# Patient Record
Sex: MALE | Race: ASIAN | NOT HISPANIC OR LATINO | URBAN - METROPOLITAN AREA
[De-identification: names, ages, dates, MRNs, and addresses within clinical notes are randomized per-mention and may not be internally consistent; named-entity substitution may affect disease eponyms.]

---

## 2017-10-07 ENCOUNTER — EMERGENCY (EMERGENCY)
Facility: HOSPITAL | Age: 32
LOS: 1 days | Discharge: HOME | End: 2017-10-07
Attending: EMERGENCY MEDICINE | Admitting: EMERGENCY MEDICINE
Payer: COMMERCIAL

## 2017-10-07 VITALS
SYSTOLIC BLOOD PRESSURE: 119 MMHG | OXYGEN SATURATION: 98 % | RESPIRATION RATE: 17 BRPM | DIASTOLIC BLOOD PRESSURE: 79 MMHG | HEART RATE: 61 BPM

## 2017-10-07 VITALS
DIASTOLIC BLOOD PRESSURE: 82 MMHG | HEIGHT: 73 IN | TEMPERATURE: 98 F | SYSTOLIC BLOOD PRESSURE: 143 MMHG | WEIGHT: 250 LBS | RESPIRATION RATE: 16 BRPM | OXYGEN SATURATION: 99 % | HEART RATE: 75 BPM

## 2017-10-07 DIAGNOSIS — M10.9 GOUT, UNSPECIFIED: ICD-10-CM

## 2017-10-07 DIAGNOSIS — R60.0 LOCALIZED EDEMA: ICD-10-CM

## 2017-10-07 DIAGNOSIS — F17.210 NICOTINE DEPENDENCE, CIGARETTES, UNCOMPLICATED: ICD-10-CM

## 2017-10-07 DIAGNOSIS — M25.561 PAIN IN RIGHT KNEE: ICD-10-CM

## 2017-10-07 DIAGNOSIS — M54.31 SCIATICA, RIGHT SIDE: ICD-10-CM

## 2017-10-07 PROCEDURE — 73562 X-RAY EXAM OF KNEE 3: CPT

## 2017-10-07 PROCEDURE — 93971 EXTREMITY STUDY: CPT

## 2017-10-07 PROCEDURE — 93971 EXTREMITY STUDY: CPT | Mod: 26,RT

## 2017-10-07 PROCEDURE — 73562 X-RAY EXAM OF KNEE 3: CPT | Mod: 26,RT

## 2017-10-07 PROCEDURE — 99284 EMERGENCY DEPT VISIT MOD MDM: CPT | Mod: 25

## 2017-10-07 PROCEDURE — 99284 EMERGENCY DEPT VISIT MOD MDM: CPT

## 2017-10-07 PROCEDURE — 96372 THER/PROPH/DIAG INJ SC/IM: CPT

## 2017-10-07 RX ORDER — CYCLOBENZAPRINE HYDROCHLORIDE 10 MG/1
1 TABLET, FILM COATED ORAL
Qty: 9 | Refills: 0 | OUTPATIENT
Start: 2017-10-07 | End: 2017-10-10

## 2017-10-07 RX ORDER — KETOROLAC TROMETHAMINE 30 MG/ML
60 SYRINGE (ML) INJECTION ONCE
Qty: 0 | Refills: 0 | Status: DISCONTINUED | OUTPATIENT
Start: 2017-10-07 | End: 2017-10-07

## 2017-10-07 RX ORDER — IBUPROFEN 200 MG
1 TABLET ORAL
Qty: 20 | Refills: 0 | OUTPATIENT
Start: 2017-10-07 | End: 2017-10-12

## 2017-10-07 RX ADMIN — Medication 60 MILLIGRAM(S): at 13:40

## 2017-10-07 RX ADMIN — Medication 60 MILLIGRAM(S): at 13:55

## 2017-10-07 NOTE — ED PROVIDER NOTE - PROGRESS NOTE DETAILS
patient resting comfortably, doppler negative dvt, xray knee small effusion, advised follow up with ortho, given information for PMD Dr Fulton.  rx for motrin and flexeril sent to pharmacy, copy of results given

## 2017-10-07 NOTE — ED ADULT TRIAGE NOTE - CHIEF COMPLAINT QUOTE
Pt reports Right LE swelling for 2 weeks and originally patient believed to be gout and took his medicine, but now no relief

## 2017-10-07 NOTE — ED PROVIDER NOTE - ATTENDING CONTRIBUTION TO CARE
pt c/o 2 weeks of right knee pain. pt denies trauma, fevers, chills, weakness, numbness, back pain. 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  rle hip nt, full rom, knee tender with mild decreased rom, ankle nt, full rom, distal n/v intact

## 2017-10-07 NOTE — ED ADULT NURSE NOTE - OBJECTIVE STATEMENT
Presents to ER w c/o gout/right leg pain.  Pt states he ran out of his gout meds and hasn't taken them in the past 2 months.  Pt reports pain starting at his right groin and extends down into his right knee and leg. Presents to ER w c/o gout/right leg pain.  Pt states he ran out of his gout meds and hasn't taken them in the past 2 months.  Pt reports pain starting at his right groin and extends down into his right knee and leg.  Knee is visibly swollen upon exam.

## 2017-10-07 NOTE — ED PROVIDER NOTE - CARE PLAN
Principal Discharge DX:	Sciatica of right side Principal Discharge DX:	Sciatica of right side  Secondary Diagnosis:	Knee pain, unspecified chronicity, unspecified laterality

## 2017-10-07 NOTE — ED PROVIDER NOTE - OBJECTIVE STATEMENT
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  states has hx of gout , no flare in 2 mos, was on colchicine, completed those tabs and presently takes allopurinol occasionally, is supposed to take it twice a day.  No PMD

## 2018-01-01 ENCOUNTER — OUTPATIENT (OUTPATIENT)
Dept: OUTPATIENT SERVICES | Facility: HOSPITAL | Age: 33
LOS: 1 days | End: 2018-01-01

## 2018-01-25 ENCOUNTER — EMERGENCY (EMERGENCY)
Facility: HOSPITAL | Age: 33
LOS: 1 days | Discharge: HOME | End: 2018-01-25
Attending: EMERGENCY MEDICINE | Admitting: EMERGENCY MEDICINE
Payer: MEDICAID

## 2018-01-25 VITALS
RESPIRATION RATE: 16 BRPM | TEMPERATURE: 98 F | HEART RATE: 70 BPM | OXYGEN SATURATION: 98 % | DIASTOLIC BLOOD PRESSURE: 80 MMHG | SYSTOLIC BLOOD PRESSURE: 129 MMHG

## 2018-01-25 VITALS
HEIGHT: 72 IN | SYSTOLIC BLOOD PRESSURE: 139 MMHG | HEART RATE: 75 BPM | DIASTOLIC BLOOD PRESSURE: 89 MMHG | OXYGEN SATURATION: 97 % | RESPIRATION RATE: 17 BRPM | WEIGHT: 250 LBS | TEMPERATURE: 98 F

## 2018-01-25 DIAGNOSIS — R69 ILLNESS, UNSPECIFIED: ICD-10-CM

## 2018-01-25 PROCEDURE — 99284 EMERGENCY DEPT VISIT MOD MDM: CPT | Mod: 25

## 2018-01-25 PROCEDURE — 73630 X-RAY EXAM OF FOOT: CPT

## 2018-01-25 PROCEDURE — 73140 X-RAY EXAM OF FINGER(S): CPT

## 2018-01-25 PROCEDURE — 73610 X-RAY EXAM OF ANKLE: CPT

## 2018-01-25 PROCEDURE — 73610 X-RAY EXAM OF ANKLE: CPT | Mod: 26,RT

## 2018-01-25 PROCEDURE — 73630 X-RAY EXAM OF FOOT: CPT | Mod: 26,RT

## 2018-01-25 PROCEDURE — 90471 IMMUNIZATION ADMIN: CPT

## 2018-01-25 PROCEDURE — 99284 EMERGENCY DEPT VISIT MOD MDM: CPT

## 2018-01-25 PROCEDURE — 73140 X-RAY EXAM OF FINGER(S): CPT | Mod: 26,RT

## 2018-01-25 PROCEDURE — 90715 TDAP VACCINE 7 YRS/> IM: CPT

## 2018-01-25 RX ORDER — TETANUS TOXOID, REDUCED DIPHTHERIA TOXOID AND ACELLULAR PERTUSSIS VACCINE, ADSORBED 5; 2.5; 8; 8; 2.5 [IU]/.5ML; [IU]/.5ML; UG/.5ML; UG/.5ML; UG/.5ML
0.5 SUSPENSION INTRAMUSCULAR ONCE
Qty: 0 | Refills: 0 | Status: COMPLETED | OUTPATIENT
Start: 2018-01-25 | End: 2018-01-25

## 2018-01-25 RX ORDER — LIDOCAINE 4 G/100G
1 CREAM TOPICAL ONCE
Qty: 0 | Refills: 0 | Status: DISCONTINUED | OUTPATIENT
Start: 2018-01-25 | End: 2018-01-25

## 2018-01-25 RX ORDER — COLCHICINE 0.6 MG
1 TABLET ORAL
Qty: 0 | Refills: 0 | COMMUNITY

## 2018-01-25 RX ORDER — INDOMETHACIN 50 MG
0 CAPSULE ORAL
Qty: 0 | Refills: 0 | COMMUNITY

## 2018-01-25 RX ADMIN — Medication 500 MILLIGRAM(S): at 10:49

## 2018-01-25 RX ADMIN — TETANUS TOXOID, REDUCED DIPHTHERIA TOXOID AND ACELLULAR PERTUSSIS VACCINE, ADSORBED 0.5 MILLILITER(S): 5; 2.5; 8; 8; 2.5 SUSPENSION INTRAMUSCULAR at 10:49

## 2018-01-25 NOTE — ED PROVIDER NOTE - PLAN OF CARE
Return to the ED for any new or worsening symptoms  Take your medication as prescribed  ACE wrap to right ankle as needed for comfort and support   Ice to affected ankle on 20 min off 40 min as needed for pain and swelling  Elevate ankle to reduce swelling  Bacitracin to affected abrasions 2 times a day   Follow up with orthopedics or podiatry in 2-3 days for a recheck   Advance activity as tolerated

## 2018-01-25 NOTE — ED PROVIDER NOTE - PROGRESS NOTE DETAILS
Results of images noted, no acute fracture seen.  Pt made aware that sometimes fractures may be missed on initial films and so the importance of prompt follow up was reviewed.  Pt placed in ACE for comfort.  Bacitracin applied to affected abrasions.  Copy of written instructions provided.  All questions reviewed,  follow up to orthopedics or podiatry provided

## 2018-01-25 NOTE — ED PROVIDER NOTE - OBJECTIVE STATEMENT
Pt is a 33 yo male who presents to the ED with a cc of right ankle pain.  PMHx of gout.  Pt reports that he was walking to work this morning when he stepped into a hole in the cement and "twisted" his right ankle.  Pt reports that he laterally everted his right ankle.  Denies prior fracture to the region.  He was able to stand and ambulate after the incident with mild pain to right ankle lateral aspect.  Pt further reports abrasions to his right knee and right 5th digit.  He did not strike his head, denies LOC.  Pt is not on blood thinners.  Pt denies all other complaints.  He is right hand dominant.  Unsure of date of last Tetanus.

## 2018-01-25 NOTE — ED ADULT NURSE NOTE - OBJECTIVE STATEMENT
Pt reports he was walking from a sidewalk to a street & there was a dip in the road. Pt reports he twisted his right ankle and fell. Abrasions to right knee & tip of right 5th digit, no active bleeding at this time, denies right knee pain, full ROM to right knee & right 5th digit. Swelling to right ankle. Pt reports he landed on his back when he fell, denies back pain, denies hitting head or change in LOC. Pt reports 8/10 right ankle pain, offered Tylenol/Motrin, refused

## 2018-01-25 NOTE — ED ADULT TRIAGE NOTE - CHIEF COMPLAINT QUOTE
"I was heading to work and there was a hole in the street and I twisted my ankle and fell on my back"

## 2018-01-25 NOTE — ED PROVIDER NOTE - MUSCULOSKELETAL, MLM
No midline C/T/L TTP no step offs or deformities noted Right ankle: TTP to right lateral malleolus with moderate swelling no TTP to metatarsal bones sensation intact +pedal pulse Abrasions noted to right knee no TTP to patellar region Abrasion noted to right hand 5th digit palmar aspect distal aspect FROM no yesenia tenderness no snuff box tenderness +radial pulse cap refill less then 2 seconds

## 2018-01-25 NOTE — ED PROVIDER NOTE - CARE PLAN
Principal Discharge DX:	Sprain of right ankle, initial encounter  Assessment and plan of treatment:	Return to the ED for any new or worsening symptoms  Take your medication as prescribed  ACE wrap to right ankle as needed for comfort and support   Ice to affected ankle on 20 min off 40 min as needed for pain and swelling  Elevate ankle to reduce swelling  Bacitracin to affected abrasions 2 times a day   Follow up with orthopedics or podiatry in 2-3 days for a recheck   Advance activity as tolerated  Secondary Diagnosis:	Abrasions of multiple sites  Secondary Diagnosis:	Fall, initial encounter

## 2018-05-01 ENCOUNTER — OUTPATIENT (OUTPATIENT)
Dept: OUTPATIENT SERVICES | Facility: HOSPITAL | Age: 33
LOS: 1 days | End: 2018-05-01
Payer: MEDICAID

## 2018-05-01 PROCEDURE — G9001: CPT

## 2018-05-03 DIAGNOSIS — R69 ILLNESS, UNSPECIFIED: ICD-10-CM

## 2018-10-23 ENCOUNTER — EMERGENCY (EMERGENCY)
Facility: HOSPITAL | Age: 33
LOS: 1 days | Discharge: HOME | End: 2018-10-23
Attending: EMERGENCY MEDICINE
Payer: MEDICAID

## 2018-10-23 VITALS
DIASTOLIC BLOOD PRESSURE: 86 MMHG | SYSTOLIC BLOOD PRESSURE: 129 MMHG | HEART RATE: 84 BPM | TEMPERATURE: 99 F | OXYGEN SATURATION: 98 % | RESPIRATION RATE: 16 BRPM

## 2018-10-23 VITALS
TEMPERATURE: 100 F | SYSTOLIC BLOOD PRESSURE: 128 MMHG | HEART RATE: 100 BPM | OXYGEN SATURATION: 97 % | WEIGHT: 265 LBS | DIASTOLIC BLOOD PRESSURE: 80 MMHG | HEIGHT: 72 IN | RESPIRATION RATE: 16 BRPM

## 2018-10-23 PROCEDURE — 73562 X-RAY EXAM OF KNEE 3: CPT | Mod: 26,RT

## 2018-10-23 PROCEDURE — 73562 X-RAY EXAM OF KNEE 3: CPT

## 2018-10-23 PROCEDURE — 99283 EMERGENCY DEPT VISIT LOW MDM: CPT

## 2018-10-23 PROCEDURE — 99283 EMERGENCY DEPT VISIT LOW MDM: CPT | Mod: 25

## 2018-10-23 RX ORDER — COLCHICINE 0.6 MG
1 TABLET ORAL
Qty: 2 | Refills: 0
Start: 2018-10-23 | End: 2018-10-24

## 2018-10-23 RX ORDER — COLCHICINE 0.6 MG
1.2 TABLET ORAL ONCE
Qty: 0 | Refills: 0 | Status: COMPLETED | OUTPATIENT
Start: 2018-10-23 | End: 2018-10-23

## 2018-10-23 RX ORDER — INDOMETHACIN 50 MG
50 CAPSULE ORAL ONCE
Qty: 0 | Refills: 0 | Status: COMPLETED | OUTPATIENT
Start: 2018-10-23 | End: 2018-10-23

## 2018-10-23 RX ORDER — IBUPROFEN 200 MG
600 TABLET ORAL ONCE
Qty: 0 | Refills: 0 | Status: DISCONTINUED | OUTPATIENT
Start: 2018-10-23 | End: 2018-10-23

## 2018-10-23 RX ORDER — INDOMETHACIN 50 MG
2 CAPSULE ORAL
Qty: 24 | Refills: 0 | OUTPATIENT
Start: 2018-10-23 | End: 2018-10-26

## 2018-10-23 RX ADMIN — Medication 60 MILLIGRAM(S): at 19:28

## 2018-10-23 RX ADMIN — Medication 1.2 MILLIGRAM(S): at 19:28

## 2018-10-23 RX ADMIN — Medication 50 MILLIGRAM(S): at 19:28

## 2018-10-23 NOTE — ED PROVIDER NOTE - ATTENDING CONTRIBUTION TO CARE
I have personally performed a face to face diagnostic evaluation on this patient.  I have reviewed the PA note and agree with the history, exam, and plan of care, except as noted.  History and Exam by me shows  right knee pain for days similar to prior gout, got worse over the weekend.  right knee- diffusely tender c mild swelling and limited rom due to pain.  xr- right knee effusion.

## 2018-10-23 NOTE — ED PROVIDER NOTE - PHYSICAL EXAMINATION
MS RLE:+ RIGHT KNEE DIFFUSELY TTP WITH  MINIMAL SWELLING. NO ERYTHEMA, NO DEFORMITY. UNABLE TO ROM BUT PT ACTIVELY FLEXING KNEE WITHOUT ISSUE. SENSATION GROSSLY INTACT.

## 2018-10-23 NOTE — ED PROVIDER NOTE - PROGRESS NOTE DETAILS
pt improved and walking around er. will rx colchicine and prednisone. advised on S&S of septic joint and when to return to ed. All questions answered and concerns addressed. pt verbalized understanding and agreement with plan and dx. pt advised to follow up with PMD. pt advised to return to ed for worsenng symptoms including fever, cp, sob. will dc.

## 2018-10-23 NOTE — ED ADULT TRIAGE NOTE - CHIEF COMPLAINT QUOTE
"I have pain & swelling in my right knee. It feels like when I've had gout in the other knee in the past. Last time, they drained the knee"

## 2018-10-23 NOTE — ED ADULT NURSE NOTE - NSIMPLEMENTINTERV_GEN_ALL_ED
Implemented All Universal Safety Interventions:  [place] to call system. Call bell, personal items and telephone within reach. Instruct patient to call for assistance. Room bathroom lighting operational. Non-slip footwear when patient is off stretcher. Physically safe environment: no spills, clutter or unnecessary equipment. Stretcher in lowest position, wheels locked, appropriate side rails in place.

## 2018-10-23 NOTE — ED PROVIDER NOTE - OBJECTIVE STATEMENT
pt is a 34yo male with pmhx of gout c/o knee pain x days. pt reports constant right knee pain with swelling. pt reports he did not do anything for pain. pt reports walking increases pain. pt reports hx of gout, last flare L knee, similar symptoms. pt denies injury, cp, sob.

## 2018-10-25 RX ORDER — COLCHICINE 0.6 MG
1 TABLET ORAL
Qty: 10 | Refills: 0
Start: 2018-10-25 | End: 2018-10-29

## 2019-03-15 ENCOUNTER — EMERGENCY (EMERGENCY)
Facility: HOSPITAL | Age: 34
LOS: 1 days | Discharge: HOME | End: 2019-03-15
Attending: EMERGENCY MEDICINE | Admitting: EMERGENCY MEDICINE
Payer: COMMERCIAL

## 2019-03-15 VITALS
SYSTOLIC BLOOD PRESSURE: 120 MMHG | DIASTOLIC BLOOD PRESSURE: 70 MMHG | RESPIRATION RATE: 16 BRPM | TEMPERATURE: 98 F | HEART RATE: 80 BPM | OXYGEN SATURATION: 99 %

## 2019-03-15 VITALS
HEIGHT: 72 IN | SYSTOLIC BLOOD PRESSURE: 83 MMHG | HEART RATE: 83 BPM | OXYGEN SATURATION: 98 % | TEMPERATURE: 99 F | WEIGHT: 279.99 LBS | RESPIRATION RATE: 28 BRPM | DIASTOLIC BLOOD PRESSURE: 58 MMHG

## 2019-03-15 PROCEDURE — 73562 X-RAY EXAM OF KNEE 3: CPT

## 2019-03-15 PROCEDURE — 73562 X-RAY EXAM OF KNEE 3: CPT | Mod: 26,LT

## 2019-03-15 PROCEDURE — 99284 EMERGENCY DEPT VISIT MOD MDM: CPT

## 2019-03-15 PROCEDURE — 99284 EMERGENCY DEPT VISIT MOD MDM: CPT | Mod: 25

## 2019-03-15 RX ORDER — FAMOTIDINE 10 MG/ML
1 INJECTION INTRAVENOUS
Qty: 30 | Refills: 0
Start: 2019-03-15

## 2019-03-15 RX ORDER — COLCHICINE 0.6 MG
1.2 TABLET ORAL ONCE
Qty: 0 | Refills: 0 | Status: COMPLETED | OUTPATIENT
Start: 2019-03-15 | End: 2019-03-15

## 2019-03-15 RX ORDER — COLCHICINE 0.6 MG
0.6 TABLET ORAL ONCE
Qty: 0 | Refills: 0 | Status: COMPLETED | OUTPATIENT
Start: 2019-03-15 | End: 2019-03-15

## 2019-03-15 RX ADMIN — Medication 60 MILLIGRAM(S): at 20:30

## 2019-03-15 RX ADMIN — Medication 1.2 MILLIGRAM(S): at 20:30

## 2019-03-15 RX ADMIN — Medication 500 MILLIGRAM(S): at 21:00

## 2019-03-15 RX ADMIN — Medication 500 MILLIGRAM(S): at 20:30

## 2019-03-15 RX ADMIN — Medication 0.6 MILLIGRAM(S): at 22:20

## 2019-03-15 NOTE — ED PROVIDER NOTE - NSFOLLOWUPINSTRUCTIONS_ED_ALL_ED_FT
PREDNISONE ONCE DAILY  NAPROXEN 1 PILL TWICE DAILY FOR PAIN  FOLLOW UP WITH DR OLMOS  ICE TO KNEE  KEEP ACE WRAP ON FOR COMFORT AS DIRECTED

## 2019-03-15 NOTE — ED PROVIDER NOTE - OBJECTIVE STATEMENT
pt is a 32 yo male who has hx of gout   and sleep apnea cant recall name of his pmd developed sudden onset of left knee pain similar to previous gout pain. the pain was severe so he came toe r fore rebecca  denies trauma no calf pain no calf swelling pain   he is asking for referral to ortho /rheum specialist

## 2019-03-15 NOTE — ED PROVIDER NOTE - MUSCULOSKELETAL, MLM
Spine appears normal, range of motion is limited at left knee with no palp effusion no erythema no calf pain no thigh pain only generalized pain on rom there is full prom with apprehension

## 2019-03-15 NOTE — ED PROVIDER NOTE - CONSTITUTIONAL, MLM
normal... Well appearing, obese male uncomfortable secondary to knee pain well nourished, awake, alert, oriented to person, place, time/situation

## 2019-03-15 NOTE — ED ADULT NURSE NOTE - OBJECTIVE STATEMENT
Patient alert and oriented X 3. Complaining of left knee pain X 3 days. Patient with history of gout. + swelling and limited range of motion

## 2019-03-15 NOTE — ED PROVIDER NOTE - CARE PROVIDER_API CALL
Chito Estrada)  Orthopaedic Surgery  [address] [place] [address]  Phone: [phone]  Fax: [phone]  Follow Up Time:

## 2019-03-15 NOTE — ED PROVIDER NOTE - DIAGNOSTIC INTERPRETATION
knee: there is moderate effusion pt refused knee arthrocentesis or other   allowed for ace wrap- felt better will refer to ortho

## 2019-05-31 ENCOUNTER — EMERGENCY (EMERGENCY)
Facility: HOSPITAL | Age: 34
LOS: 1 days | Discharge: HOME | End: 2019-05-31
Attending: EMERGENCY MEDICINE | Admitting: EMERGENCY MEDICINE
Payer: COMMERCIAL

## 2019-05-31 VITALS
HEART RATE: 80 BPM | WEIGHT: 259.93 LBS | HEIGHT: 73 IN | TEMPERATURE: 99 F | SYSTOLIC BLOOD PRESSURE: 141 MMHG | OXYGEN SATURATION: 97 % | DIASTOLIC BLOOD PRESSURE: 85 MMHG | RESPIRATION RATE: 18 BRPM

## 2019-05-31 VITALS
DIASTOLIC BLOOD PRESSURE: 96 MMHG | OXYGEN SATURATION: 97 % | HEART RATE: 70 BPM | RESPIRATION RATE: 17 BRPM | TEMPERATURE: 98 F | SYSTOLIC BLOOD PRESSURE: 144 MMHG

## 2019-05-31 PROCEDURE — 99283 EMERGENCY DEPT VISIT LOW MDM: CPT

## 2019-05-31 RX ORDER — IBUPROFEN 200 MG
600 TABLET ORAL ONCE
Refills: 0 | Status: COMPLETED | OUTPATIENT
Start: 2019-05-31 | End: 2019-05-31

## 2019-05-31 RX ORDER — COLCHICINE 0.6 MG
1 TABLET ORAL
Qty: 4 | Refills: 0
Start: 2019-05-31 | End: 2019-06-01

## 2019-05-31 RX ORDER — COLCHICINE 0.6 MG
0.6 TABLET ORAL ONCE
Refills: 0 | Status: COMPLETED | OUTPATIENT
Start: 2019-05-31 | End: 2019-05-31

## 2019-05-31 RX ORDER — COLCHICINE 0.6 MG
1.2 TABLET ORAL ONCE
Refills: 0 | Status: COMPLETED | OUTPATIENT
Start: 2019-05-31 | End: 2019-05-31

## 2019-05-31 RX ADMIN — Medication 0.6 MILLIGRAM(S): at 10:54

## 2019-05-31 RX ADMIN — Medication 600 MILLIGRAM(S): at 09:43

## 2019-05-31 RX ADMIN — Medication 1.2 MILLIGRAM(S): at 09:43

## 2019-05-31 RX ADMIN — Medication 60 MILLIGRAM(S): at 09:43

## 2019-05-31 NOTE — ED PROVIDER NOTE - NSFOLLOWUPINSTRUCTIONS_ED_ALL_ED_FT
1) Follow-up with your Primary Medical Doctor or referred doctor. Call today / next business day for prompt follow-up.  2) Return to Emergency room for any worsening or persistent pain, weakness, fever, or any other concerning symptoms.  3) See attached instruction sheets for additional information, including information regarding signs and symptoms to look out for, reasons to seek immediate care and other important instructions.  4) Follow-up with any specialists as discussed / noted as well.   5) Take colchicine 0.6mg twice a day x 2 days.  Prednisone 50mg once a day for 4 days.

## 2019-05-31 NOTE — ED ADULT NURSE NOTE - NSIMPLEMENTINTERV_GEN_ALL_ED
Implemented All Universal Safety Interventions:  [place] to call system. Call bell, personal items and telephone within reach. Instruct patient to call for assistance. Room bathroom lighting operational. Non-slip footwear when patient is off stretcher. Physically safe environment: no spills, clutter or unnecessary equipment. Stretcher in lowest position, wheels locked, appropriate side rails in place.

## 2019-05-31 NOTE — ED PROVIDER NOTE - OBJECTIVE STATEMENT
34 yo male hx of gout c/o gouty flare after drinking alcohol yesterday for cousin's birthday.  c/o pain to left 1st toe and right knee where he usually gets it.  Has appointment with specialist in 1 week.  No fever/chills.  No medications taken for this.  Last flare was 2 months ago.

## 2019-05-31 NOTE — ED ADULT TRIAGE NOTE - CHIEF COMPLAINT QUOTE
patient states he had multiple drinks last night for his cousins birthday and now he feels his gout flaring up in his left big toe and left knee. Not currently on daily medication

## 2019-05-31 NOTE — ED ADULT NURSE NOTE - OBJECTIVE STATEMENT
pt with complaints of left great toe pain, left ankle pain and right knee pain. " I did it to myself. I went out drinking for my cousins birthday and I am having a gout flare up" pt asking for allopurinol prescription.

## 2019-07-18 ENCOUNTER — EMERGENCY (EMERGENCY)
Facility: HOSPITAL | Age: 34
LOS: 1 days | Discharge: HOME | End: 2019-07-18
Attending: EMERGENCY MEDICINE | Admitting: EMERGENCY MEDICINE
Payer: COMMERCIAL

## 2019-07-18 VITALS
RESPIRATION RATE: 18 BRPM | DIASTOLIC BLOOD PRESSURE: 85 MMHG | OXYGEN SATURATION: 98 % | SYSTOLIC BLOOD PRESSURE: 125 MMHG | WEIGHT: 279.99 LBS | TEMPERATURE: 98 F | HEART RATE: 86 BPM

## 2019-07-18 VITALS
OXYGEN SATURATION: 97 % | HEART RATE: 72 BPM | SYSTOLIC BLOOD PRESSURE: 121 MMHG | RESPIRATION RATE: 16 BRPM | TEMPERATURE: 98 F | DIASTOLIC BLOOD PRESSURE: 73 MMHG

## 2019-07-18 PROCEDURE — 99283 EMERGENCY DEPT VISIT LOW MDM: CPT

## 2019-07-18 RX ORDER — IBUPROFEN 200 MG
600 TABLET ORAL ONCE
Refills: 0 | Status: COMPLETED | OUTPATIENT
Start: 2019-07-18 | End: 2019-07-18

## 2019-07-18 RX ORDER — COLCHICINE 0.6 MG
1 TABLET ORAL
Qty: 10 | Refills: 0
Start: 2019-07-18 | End: 2019-07-22

## 2019-07-18 RX ORDER — ACETAMINOPHEN 500 MG
650 TABLET ORAL ONCE
Refills: 0 | Status: COMPLETED | OUTPATIENT
Start: 2019-07-18 | End: 2019-07-18

## 2019-07-18 RX ORDER — COLCHICINE 0.6 MG
0.6 TABLET ORAL ONCE
Refills: 0 | Status: COMPLETED | OUTPATIENT
Start: 2019-07-18 | End: 2019-07-18

## 2019-07-18 RX ADMIN — Medication 0.6 MILLIGRAM(S): at 09:02

## 2019-07-18 RX ADMIN — Medication 600 MILLIGRAM(S): at 09:01

## 2019-07-18 RX ADMIN — Medication 650 MILLIGRAM(S): at 09:01

## 2019-07-18 RX ADMIN — Medication 50 MILLIGRAM(S): at 09:02

## 2019-07-18 NOTE — ED PROVIDER NOTE - OBJECTIVE STATEMENT
Right ankle pain and swelling x 2 days.  HO right big toe injury over 1 week ago.  pt stopped his allupurinol 2 days ago due to possible gout attack .   HO gouty arthritis on big toes and knees.   no other complaint.

## 2019-07-18 NOTE — ED ADULT NURSE NOTE - OBJECTIVE STATEMENT
Presents to ER with right leg pain. Pt states he injured his right great toe on the 4th of July playing football.  Now is c/o pain extending from the foot all the way up the right leg.  Mild swelling noted to right foot. Bounding pedal pulse.

## 2020-03-05 ENCOUNTER — EMERGENCY (EMERGENCY)
Facility: HOSPITAL | Age: 35
LOS: 1 days | Discharge: HOME | End: 2020-03-05
Attending: EMERGENCY MEDICINE | Admitting: EMERGENCY MEDICINE
Payer: SELF-PAY

## 2020-03-05 VITALS
HEIGHT: 72 IN | HEART RATE: 86 BPM | OXYGEN SATURATION: 98 % | SYSTOLIC BLOOD PRESSURE: 133 MMHG | RESPIRATION RATE: 15 BRPM | DIASTOLIC BLOOD PRESSURE: 71 MMHG | WEIGHT: 259.93 LBS | TEMPERATURE: 98 F

## 2020-03-05 PROCEDURE — 99283 EMERGENCY DEPT VISIT LOW MDM: CPT

## 2020-03-05 RX ORDER — VALACYCLOVIR 500 MG/1
1000 TABLET, FILM COATED ORAL ONCE
Refills: 0 | Status: COMPLETED | OUTPATIENT
Start: 2020-03-05 | End: 2020-03-05

## 2020-03-05 RX ORDER — VALACYCLOVIR 500 MG/1
1 TABLET, FILM COATED ORAL
Qty: 30 | Refills: 0
Start: 2020-03-05 | End: 2020-03-14

## 2020-03-05 RX ADMIN — VALACYCLOVIR 1000 MILLIGRAM(S): 500 TABLET, FILM COATED ORAL at 11:57

## 2020-03-05 NOTE — ED PROVIDER NOTE - OBJECTIVE STATEMENT
33 yo male with electric like pains thru left leg x few days and then yesterday developed painful rash to left leg. No fever or chills. No nausea, vomiting or diarrhea. No bowel and or bladder changes.

## 2020-03-05 NOTE — ED ADULT NURSE NOTE - OBJECTIVE STATEMENT
34 year old male with  vesicular rash to left lower leg x 2 days. Also c/o pain to left buttock radiating down left leg.

## 2020-03-05 NOTE — ED PROVIDER NOTE - NSFOLLOWUPINSTRUCTIONS_ED_ALL_ED_FT
Take VALTREX 1-tablet every 8-hours for the next 10-days  Follow-up with your doctor this week  Return here if needed

## 2020-03-05 NOTE — ED ADULT NURSE NOTE - NS TRANSFER PATIENT BELONGINGS
----- Message from Saúl Zhu sent at 6/24/2019  9:47 AM CDT -----  Doctor appointment and lab have been scheduled.  Please link lab orders to the lab appointment.  Date of doctor appointment:  9/9  Date of lab appointment:  8/31  Physical or EP: Physical  Comments: Pt daughter states pt needs kidney and liver levels       Clothing

## 2020-03-05 NOTE — ED PROVIDER NOTE - PATIENT PORTAL LINK FT
You can access the FollowMyHealth Patient Portal offered by [facility] by registering at the following website: [email or web address]. By joining [employer]’s FollowMyHealth portal, you will also be able to view your health information using other applications (apps) compatible with our system.

## 2020-03-05 NOTE — ED PROVIDER NOTE - CARE PROVIDER_API CALL
Tom Miguel)  Infectious Disease; Internal Medicine  [address] [place] [address]  Phone: [phone]  Follow Up Time:

## 2020-03-16 NOTE — ED ADULT NURSE NOTE - CAS ELECT INFOMATION PROVIDED
Patient has more questions regarding the Culture and she is very concerned as to this going to her Brain.  Patient would like to hear from you because she is now stressing out.   DC instructions

## 2020-08-28 ENCOUNTER — EMERGENCY (EMERGENCY)
Facility: HOSPITAL | Age: 35
LOS: 1 days | Discharge: HOME | End: 2020-08-28
Attending: INTERNAL MEDICINE | Admitting: INTERNAL MEDICINE
Payer: MEDICAID

## 2020-08-28 VITALS
TEMPERATURE: 98 F | SYSTOLIC BLOOD PRESSURE: 131 MMHG | OXYGEN SATURATION: 97 % | HEART RATE: 77 BPM | RESPIRATION RATE: 18 BRPM | DIASTOLIC BLOOD PRESSURE: 78 MMHG | WEIGHT: 259.93 LBS | HEIGHT: 72 IN

## 2020-08-28 PROBLEM — F12.90 CANNABIS USE, UNSPECIFIED, UNCOMPLICATED: Chronic | Status: ACTIVE | Noted: 2020-03-05

## 2020-08-28 PROCEDURE — 99283 EMERGENCY DEPT VISIT LOW MDM: CPT | Mod: 25

## 2020-08-28 PROCEDURE — 99283 EMERGENCY DEPT VISIT LOW MDM: CPT

## 2020-08-28 PROCEDURE — 96372 THER/PROPH/DIAG INJ SC/IM: CPT

## 2020-08-28 RX ORDER — COLCHICINE 0.6 MG
1 TABLET ORAL
Qty: 10 | Refills: 0
Start: 2020-08-28 | End: 2020-09-06

## 2020-08-28 RX ORDER — KETOROLAC TROMETHAMINE 30 MG/ML
30 SYRINGE (ML) INJECTION ONCE
Refills: 0 | Status: DISCONTINUED | OUTPATIENT
Start: 2020-08-28 | End: 2020-08-28

## 2020-08-28 RX ORDER — COLCHICINE 0.6 MG
1.2 TABLET ORAL ONCE
Refills: 0 | Status: COMPLETED | OUTPATIENT
Start: 2020-08-28 | End: 2020-08-28

## 2020-08-28 RX ADMIN — Medication 30 MILLIGRAM(S): at 01:19

## 2020-08-28 RX ADMIN — Medication 1.2 MILLIGRAM(S): at 01:19

## 2020-08-28 NOTE — ED PROVIDER NOTE - CARE PROVIDER_API CALL
Avinash Mccarthy  INTERNAL MEDICINE  [address] [place] [address]  Phone: [phone]  Fax: [phone]  Follow Up Time:

## 2020-08-28 NOTE — ED PROVIDER NOTE - CCCP TRG CHIEF CMPLNT
RN spoke to patient and patient verified last name and date of birth. RN spoke to patient regarding request for OCP for acne. Advised pt that Dr. Davidson is willing to do this for her x 3 months but after will need different prescriber as he is leaving practice. She is saddened to hear this news. She denies history of HTN, migraines with aura, stroke, and blood clots. Pt missed RN postpartum call; postpartum appt with provider scheduled with Dr. Davidson on 6/16/2020. To call clinic prior to appt anyone in household has fever, cough, SOB, sore throat, new onset diarrhea or N/V, new onset of loss of taste or smell, or if anyone in household has tested positive for COVID 19 in past 14 days; Pt denies these today. Will be screened upon entering facility for appointments. To park in structure on corner of [facility] and [address] [place] [address] and cross [facility] to enter medical complex on 1st floor. There are only certain entrances that can be used. Patients are requested to not to bring guests or children to the appts at this time. She reports everything is going well. She is bottle feeding babies and they both gained 2 pounds with doctor visit. Denies heavy bleeding, pain, incisional swelling or drainage, fever,and  body aches. Briefly reviewed postpartum depression. She denies feeling depressed or hopeless and SI/HI. Discussed what to watch out for and when to see professional help. Denies need for any additional emotional support. Her  is very supportive. Patient verbalized understanding, and was encouraged to call back at [phone] with any additional questions or concerns.   ankle pain/injury

## 2020-08-28 NOTE — ED PROVIDER NOTE - SIGNIFICANT NEGATIVE FINDINGS
no headache, no chest pain, no SOB, no palpitations, no n/v, no fever, no chills, no trauma,  no neuro changes.

## 2020-08-28 NOTE — ED ADULT NURSE NOTE - OBJECTIVE STATEMENT
Pt presents to ED c/o L ankle pain. Pt states he has a hx of gout and felt tightness in his ankle today and it is now worse. Pt tried icing and elevated but it did not work. Pt denies any injury to extremity, numbness, tingling.

## 2020-08-28 NOTE — ED PROVIDER NOTE - PATIENT PORTAL LINK FT
You can access the FollowMyHealth Patient Portal offered by Health system by registering at the following website: [email or web address]. By joining [employer]’s FollowMyHealth portal, you will also be able to view your health information using other applications (apps) compatible with our system.

## 2020-08-28 NOTE — ED ADULT NURSE NOTE - CHPI ED NUR SYMPTOMS NEG
no vomiting/no tingling/no chills/no fever/no nausea/no weakness/no dizziness/no decreased eating/drinking

## 2020-08-28 NOTE — ED PROVIDER NOTE - NSFOLLOWUPINSTRUCTIONS_ED_ALL_ED_FT
For your Gout attack take the medrol dose pack and colchicine as directed.   f/u with your primary care doctor or the rheumatologist referral

## 2020-08-28 NOTE — ED ADULT NURSE NOTE - NSIMPLEMENTINTERV_GEN_ALL_ED
Implemented All Universal Safety Interventions:  [place] to call system. Call bell, personal items and telephone within reach. Instruct patient to call for assistance. Room bathroom lighting operational. Non-slip footwear when patient is off stretcher. Physically safe environment: no spills, clutter or unnecessary equipment. Stretcher in lowest position, wheels locked, appropriate side rails in place.

## 2020-09-14 ENCOUNTER — EMERGENCY (EMERGENCY)
Age: 35
LOS: 1 days | Discharge: HOME | End: 2020-09-14
Attending: STUDENT IN AN ORGANIZED HEALTH CARE EDUCATION/TRAINING PROGRAM | Admitting: STUDENT IN AN ORGANIZED HEALTH CARE EDUCATION/TRAINING PROGRAM
Payer: MEDICAID

## 2020-09-14 VITALS
HEIGHT: 72 IN | OXYGEN SATURATION: 97 % | RESPIRATION RATE: 18 BRPM | TEMPERATURE: 98 F | SYSTOLIC BLOOD PRESSURE: 124 MMHG | HEART RATE: 80 BPM | WEIGHT: 259.93 LBS | DIASTOLIC BLOOD PRESSURE: 83 MMHG

## 2020-09-14 VITALS
HEART RATE: 77 BPM | DIASTOLIC BLOOD PRESSURE: 80 MMHG | OXYGEN SATURATION: 98 % | RESPIRATION RATE: 17 BRPM | SYSTOLIC BLOOD PRESSURE: 120 MMHG

## 2020-09-14 PROCEDURE — 99283 EMERGENCY DEPT VISIT LOW MDM: CPT

## 2020-09-14 PROCEDURE — 73610 X-RAY EXAM OF ANKLE: CPT | Mod: 26,RT

## 2020-09-14 PROCEDURE — 99283 EMERGENCY DEPT VISIT LOW MDM: CPT | Mod: 25

## 2020-09-14 PROCEDURE — 73610 X-RAY EXAM OF ANKLE: CPT

## 2020-09-14 RX ORDER — COLCHICINE 0.6 MG
1.2 TABLET ORAL ONCE
Refills: 0 | Status: COMPLETED | OUTPATIENT
Start: 2020-09-14 | End: 2020-09-14

## 2020-09-14 RX ORDER — COLCHICINE 0.6 MG
1 TABLET ORAL
Qty: 7 | Refills: 0
Start: 2020-09-14 | End: 2020-09-20

## 2020-09-14 NOTE — ED PROVIDER NOTE - PATIENT PORTAL LINK FT
You can access the FollowMyHealth Patient Portal offered by [facility] by registering at the following website: [email or web address]. By joining [employer]’s FollowMyHealth portal, you will also be able to view your health information using other applications (apps) compatible with our system.

## 2020-09-14 NOTE — ED PROVIDER NOTE - NSFOLLOWUPCLINICS_GEN_ALL_ED_FT
[facility]  Rheumatology  [address] [address]  Phone: [phone]  Fax:   Follow Up Time:

## 2020-09-14 NOTE — ED PROVIDER NOTE - PHYSICAL EXAMINATION
Gen:  Well appearning in NAD  Head:  NC/AT  Resp: No distress   Ext: no deformities, right ankle swelling, warmth, no erythema limited ROM 2/2   Skin: warm and dry as visualized

## 2020-09-14 NOTE — ED PROVIDER NOTE - NSFOLLOWUPINSTRUCTIONS_ED_ALL_ED_FT
1. TAKE ALL MEDICATIONS AS DIRECTED.    2. FOR PAIN OR FEVER YOU CAN TAKE IBUPROFEN (MOTRIN, ADVIL) OR ACETAMINOPHEN (TYLENOL) AS NEEDED, AS DIRECTED ON PACKAGING.  3. FOLLOW UP WITH YOUR PRIMARY DOCTOR WITHIN 5 DAYS AS DIRECTED.  4. IF YOU HAD LABS OR IMAGING DONE, YOU WERE GIVEN COPIES OF ALL LABS AND/OR IMAGING RESULTS FROM YOUR ER VISIT--PLEASE TAKE THEM WITH YOU TO YOUR FOLLOW UP APPOINTMENTS.  5. IF NEEDED, CALL PATIENT ACCESS SERVICES AT [phone] TO FIND A PRIMARY CARE PHYSICIAN.  OR CALL [phone] TO MAKE AN APPOINTMENT WITH THE CLINIC.  6. RETURN TO THE ER FOR ANY WORSENING SYMPTOMS OR CONCERNS.    TAKE NAPROSYN TWICE A DAY AS DIRECTED. TAKE COLCHICINE AS DIRECTED  FOLLOW UP WITH YOUR PRIMARY DOCTOR AND WITH RHEUMATOLOGY.  RETURN TO ED FOR WORSENING PAIN, SWELLING FEVERS, CHILLS OR OTHER CONCERNS

## 2020-09-14 NOTE — ED PROVIDER NOTE - OBJECTIVE STATEMENT
34yo M ho gout pw right ankle pain and swelling since last night, consistent with prior gout flares. pt had similar symptoms 2 weeks ago improved with colchicine and steroids. symptoms completely resolved and then returned last night. pt took tylenol and drank water   no fevers, no chills

## 2020-09-14 NOTE — ED PROVIDER NOTE - CLINICAL SUMMARY MEDICAL DECISION MAKING FREE TEXT BOX
34yo m with right ankle swelling, no systemic symptoms, likely gout flare and similar to prior, will traet with nsaids and colchicine, rheum and IM follow up

## 2020-09-25 ENCOUNTER — EMERGENCY (EMERGENCY)
Facility: HOSPITAL | Age: 35
LOS: 1 days | Discharge: HOME | End: 2020-09-25
Attending: EMERGENCY MEDICINE | Admitting: EMERGENCY MEDICINE
Payer: MEDICAID

## 2020-09-25 VITALS
SYSTOLIC BLOOD PRESSURE: 160 MMHG | HEART RATE: 74 BPM | OXYGEN SATURATION: 100 % | WEIGHT: 259.93 LBS | TEMPERATURE: 98 F | RESPIRATION RATE: 14 BRPM | HEIGHT: 72 IN | DIASTOLIC BLOOD PRESSURE: 90 MMHG

## 2020-09-25 PROCEDURE — 73140 X-RAY EXAM OF FINGER(S): CPT

## 2020-09-25 PROCEDURE — 99283 EMERGENCY DEPT VISIT LOW MDM: CPT | Mod: 25

## 2020-09-25 PROCEDURE — 99283 EMERGENCY DEPT VISIT LOW MDM: CPT

## 2020-09-25 PROCEDURE — 73140 X-RAY EXAM OF FINGER(S): CPT | Mod: 26,RT

## 2020-09-25 RX ORDER — INDOMETHACIN 50 MG
1 CAPSULE ORAL
Qty: 21 | Refills: 0
Start: 2020-09-25 | End: 2020-10-01

## 2020-09-25 NOTE — ED ADULT NURSE NOTE - OBJECTIVE STATEMENT
Received pt in bed alert and oriented x4.  C/O finger pain/injury worsening today.  Pt stated he had a basketball injury last year which never felt the same. Pt has swelling to middle and pointer finger.  Pt states he cant bend his fingers.  Ongoing nursing care and safety maintained.

## 2020-09-25 NOTE — ED PROVIDER NOTE - PATIENT PORTAL LINK FT
You can access the FollowMyHealth Patient Portal offered by [facility] by registering at the following website: [email or web address]. By joining [employer]’s FollowMyHealth portal, you will also be able to view your health information using other applications (apps) compatible with our system.

## 2020-09-25 NOTE — ED PROVIDER NOTE - PROGRESS NOTE DETAILS
patient has appointment to see his PMD, will f/u as outpatient, understands to return to ER for worsening of symptoms, given rx for indocin

## 2020-09-25 NOTE — ED PROVIDER NOTE - OBJECTIVE STATEMENT
35 male presents to ER c/o right middle finger swelling and pain, started yesterday. Patient states one year ago that same finger was "jammed" by a basketball.

## 2020-09-25 NOTE — ED PROVIDER NOTE - NSFOLLOWUPINSTRUCTIONS_ED_ALL_ED_FT
Follow up with your primary care doctor as scheduled.        WHAT YOU NEED TO KNOW:    A low-purine diet is a meal plan based on foods that are low in purine content. Purine is a substance that is found in foods and is produced naturally by the body. Purines are broken down by the body and changed to uric acid. The kidneys normally filter the uric acid, and it leaves the body through the urine. However, people with gout sometimes have a buildup of uric acid in the blood. This buildup of uric acid can cause swelling and pain (a gout attack). A low-purine diet may help to treat and prevent gout attacks.    DISCHARGE INSTRUCTIONS:    Foods to include: The following foods are low in purine.  •Eggs, nuts, and peanut butter      •Low-fat and fat-free cheese and ice cream      •Skim or 1% milk      •Soup made without meat extract or broth      •Vegetables that are not on the medium-purine list below      •All fruit and fruit juice      •Bread, pasta, rice, cakes, cornbread, and popcorn      •Water, soda, tea, coffee, and cocoa      •Sugar, sweets, and gelatin      •Fat and oil      Foods to limit:   •Medium-purine foods:?Meats: Limit the following to 4 to 6 ounces each day.?Meat and poultry       ?Crab, lobster, oysters, and shrimp      ?Vegetables: Limit the following vegetables to ½ cup each day.?Asparagus      ?Cauliflower      ?Spinach      ?Mushrooms      ?Green peas      ?Beans, peas, and lentils (limit to 1 cup each day)      ?Oats and oatmeal (limit to ? cup uncooked each day)      ?Wheat germ and bran (limit to ¼ cup each day)      •High-purine foods: Limit or avoid foods high in purine.?Anchovies, sardines, scallops, and mussels      ?Tuna, codfish, herring, and jeronimo      ?Wild game meats, like goose and duck      ?Organ meats, such as brains, heart, kidney, liver, and sweetbreads      ?Gravies and sauces made with meat      ?Yeast extracts taken in the form of a supplement        Other guidelines to follow:   •Increase liquid intake. Drink 8 to 16 (eight-ounce) cups of liquid each day. At least half of the liquid you drink should be water. Liquid can help your body get rid of extra uric acid.       •Limit or avoid alcohol. Alcohol (especially beer) increases your risk of a gout attack. Beer contains a high amount of purine.       •Maintain a healthy weight. If you are overweight, you should lose weight slowly. Weight loss can help decrease the amount of stress on your joints. Regular exercise can help you lose weight if you are overweight, or maintain your weight if you are at a normal weight. Talk to your healthcare provider before you begin an exercise program.         © Copyright [employer] 2020           back to top                          © Copyright [employer] 2020

## 2020-09-25 NOTE — ED PROVIDER NOTE - CARE PROVIDER_API CALL
BLAKE ALCANTAR  Family Practice  [address] [place] [address]  Phone: [phone]  Fax: [phone]  Follow Up Time:

## 2020-10-08 NOTE — ED ADULT NURSE NOTE - NSFALLRSKHARMRISK_ED_ALL_ED
CT CHEST WO CONTRAST 

 

Indication: Pneumonia 

 

Technique: Noncontrast CT imaging was performed of the chest, multiplanar 

reconstruction images submitted. One or more of the following 

individualized dose reduction techniques were utilized for this 

examination:  1. Automated exposure control  2. Adjustment of the mA 

and/or kV according to patient size  3. Use of iterative reconstruction 

technique.

 

Comparison: March 5, 2020

 

Findings:  

There is persistent large area of consolidative density of the left upper 

lobe extending from the hilum to the lateral pleural surface and also 

abutting the left major fissure. There is again some associated central 

bronchiectasis. Overall size of density is similar, measuring about 7.6 cm

transverse by about 8 cm AP by up to about 6.9 cm CC. This has a somewhat 

more nodular appearing morphology inferiorly such as seen image 36 series 

2. There are again a few foci of infiltrative appearing density of the 

right middle lobe with largest of these about 2 cm transverse by 2.5 cm 

AP, peripheral subsolid appearance. There is also 0.8 cm nodule with 

somewhat irregular and some solid margins of the right middle lobe image 

33 series 2 very slightly larger as previously on the order of about 0.6 

cm. There are 3 small left lower lobe pulmonary nodules images 46 through 

48, largest of these about 0.5 cm which is similar, although 0.2 cm nodule

on image 47 is larger on this exam (previously about 0.1 cm). Small 

posterior right upper lobe nodule about 0.3 cm image 16 series 2 is 

similar. There is no pneumothorax. There is no pleural or pericardial 

fluid. There is some coronary calcification. Thoracic aortic caliber is 

within normal limits. No new significant chest lymphadenopathy is 

identified.

 

IMPRESSION:

1.  There is persistent large area of abnormal consolidative density of 

the left upper lobe. Given persistence without any interval improvement, 

concern would be for malignancy until proven otherwise. PET CT and biopsy 

are recommended. There are other bilateral lung nodules as described, a 

couple which are slightly larger.

 

Electronically signed by: Geraldo Hernandez MD (10/8/2020 4:07 PM) [facility] no

## 2020-11-12 NOTE — ED PROVIDER NOTE - NS ED MD EM SELECTION
Please follow-up with your primary care doctor.  Please follow-up with Dr. Bell from surgery regarding your gallstones    Please return to the ER for worsening abdominal pain, nausea vomiting, fevers and chills.   [address]84 Exp Problem Focused - Mod. Complex

## 2020-12-15 ENCOUNTER — EMERGENCY (EMERGENCY)
Facility: HOSPITAL | Age: 35
LOS: 1 days | Discharge: HOME | End: 2020-12-15
Attending: EMERGENCY MEDICINE | Admitting: EMERGENCY MEDICINE
Payer: MEDICAID

## 2020-12-15 VITALS
HEIGHT: 72 IN | WEIGHT: 259.93 LBS | TEMPERATURE: 98 F | HEART RATE: 66 BPM | OXYGEN SATURATION: 98 % | DIASTOLIC BLOOD PRESSURE: 91 MMHG | RESPIRATION RATE: 17 BRPM | SYSTOLIC BLOOD PRESSURE: 142 MMHG

## 2020-12-15 VITALS
HEART RATE: 65 BPM | SYSTOLIC BLOOD PRESSURE: 127 MMHG | DIASTOLIC BLOOD PRESSURE: 74 MMHG | OXYGEN SATURATION: 98 % | TEMPERATURE: 98 F | RESPIRATION RATE: 18 BRPM

## 2020-12-15 PROCEDURE — 99283 EMERGENCY DEPT VISIT LOW MDM: CPT

## 2020-12-15 RX ORDER — COLCHICINE 0.6 MG
1.2 TABLET ORAL ONCE
Refills: 0 | Status: COMPLETED | OUTPATIENT
Start: 2020-12-15 | End: 2020-12-15

## 2020-12-15 RX ORDER — COLCHICINE 0.6 MG
1 TABLET ORAL
Qty: 7 | Refills: 0
Start: 2020-12-15 | End: 2020-12-21

## 2020-12-15 RX ADMIN — Medication 40 MILLIGRAM(S): at 11:40

## 2020-12-15 RX ADMIN — Medication 1.2 MILLIGRAM(S): at 11:40

## 2020-12-15 NOTE — ED PROVIDER NOTE - NSFOLLOWUPINSTRUCTIONS_ED_ALL_ED_FT
Take medications as prescribed. Follow up with your podiatrist within the next 2-3 days. Return to ED immediately for new or worsening symptoms.

## 2020-12-15 NOTE — ED ADULT NURSE NOTE - CHPI ED NUR SYMPTOMS NEG
no fever/no tingling/no weakness/no back pain/no bruising/no deformity/no abrasion/no numbness/no stiffness

## 2020-12-15 NOTE — ED ADULT NURSE NOTE - NSIMPLEMENTINTERV_GEN_ALL_ED
Implemented All Universal Safety Interventions:  [place] to call system. Call bell, personal items and telephone within reach. Instruct patient to call for assistance. Room bathroom lighting operational. Non-slip footwear when patient is off stretcher. Physically safe environment: no spills, clutter or unnecessary equipment. Stretcher in lowest position, wheels locked, appropriate side rails in place.

## 2020-12-15 NOTE — ED PROVIDER NOTE - PATIENT PORTAL LINK FT
You can access the FollowMyHealth Patient Portal offered by [facility] by registering at the following website: [email or web address]. By joining [facility]’s FollowMyHealth portal, you will also be able to view your health information using other applications (apps) compatible with our system.

## 2020-12-15 NOTE — ED PROVIDER NOTE - OBJECTIVE STATEMENT
36 yo M PMHx gout presents to ED c/o pain R great toe x ~1-2 days. Pt states pain consistent with previous gout flares, requesting Prednisone and Colchicine. Pt denies trauma, fever/chills, numbness/tingling. Pt states he already has follow up plans with new podiatrist.

## 2020-12-15 NOTE — ED PROVIDER NOTE - ATTENDING CONTRIBUTION TO CARE
pt with hx of gout with c/o gout attack in his 1st toe.    TTP, redness to 1st toe    prednisone, colchicine, dc with fu with podiatry

## 2021-01-04 ENCOUNTER — APPOINTMENT (OUTPATIENT)
Dept: WOUND CARE | Facility: HOSPITAL | Age: 36
End: 2021-01-04
Payer: MEDICAID

## 2021-01-04 ENCOUNTER — OUTPATIENT (OUTPATIENT)
Dept: OUTPATIENT SERVICES | Facility: HOSPITAL | Age: 36
LOS: 1 days | Discharge: HOME | End: 2021-01-04
Payer: MEDICAID

## 2021-01-04 VITALS
HEIGHT: 73 IN | RESPIRATION RATE: 20 BRPM | TEMPERATURE: 97.87 F | HEART RATE: 72 BPM | WEIGHT: 255 LBS | OXYGEN SATURATION: 97 % | SYSTOLIC BLOOD PRESSURE: 113 MMHG | BODY MASS INDEX: 33.8 KG/M2 | DIASTOLIC BLOOD PRESSURE: 81 MMHG

## 2021-01-04 VITALS
WEIGHT: 255 LBS | DIASTOLIC BLOOD PRESSURE: 81 MMHG | OXYGEN SATURATION: 97 % | HEART RATE: 72 BPM | HEIGHT: 73 IN | RESPIRATION RATE: 20 BRPM | SYSTOLIC BLOOD PRESSURE: 113 MMHG | TEMPERATURE: 97.87 F | BODY MASS INDEX: 33.8 KG/M2

## 2021-01-04 DIAGNOSIS — Z78.9 OTHER SPECIFIED HEALTH STATUS: ICD-10-CM

## 2021-01-04 DIAGNOSIS — Z82.49 FAMILY HISTORY OF ISCHEMIC HEART DISEASE AND OTHER DISEASES OF THE CIRCULATORY SYSTEM: ICD-10-CM

## 2021-01-04 DIAGNOSIS — M10.072 IDIOPATHIC GOUT, LEFT ANKLE AND FOOT: ICD-10-CM

## 2021-01-04 DIAGNOSIS — Z83.3 FAMILY HISTORY OF DIABETES MELLITUS: ICD-10-CM

## 2021-01-04 DIAGNOSIS — Z87.39 PERSONAL HISTORY OF OTHER DISEASES OF THE MUSCULOSKELETAL SYSTEM AND CONNECTIVE TISSUE: ICD-10-CM

## 2021-01-04 DIAGNOSIS — M10.071 IDIOPATHIC GOUT, RIGHT ANKLE AND FOOT: ICD-10-CM

## 2021-01-04 DIAGNOSIS — S91.309A UNSPECIFIED OPEN WOUND, UNSPECIFIED FOOT, INITIAL ENCOUNTER: ICD-10-CM

## 2021-01-04 PROCEDURE — 99203 OFFICE O/P NEW LOW 30 MIN: CPT

## 2021-01-04 PROCEDURE — G0463: CPT

## 2021-01-04 RX ORDER — ALLOPURINOL 100 MG/1
100 TABLET ORAL DAILY
Qty: 14 | Refills: 0 | Status: COMPLETED | COMMUNITY
Start: 2021-01-04 | End: 2021-01-18

## 2021-01-04 RX ORDER — COLCHICINE 0.6 MG/1
0.6 TABLET ORAL TWICE DAILY
Qty: 14 | Refills: 1 | Status: COMPLETED | COMMUNITY
Start: 2021-01-04 | End: 2021-01-18

## 2021-01-04 NOTE — ED PROVIDER NOTE - CPE EDP MUSC NORM
Per pharmacy need a 90 day supply for the buPROPion XL (WELLBUTRIN XL) 150 MG 24 hr tablet last rx 12/31/20 2 months     New rx sent for 90 day supply    - - -

## 2021-01-04 NOTE — PLAN
[FreeTextEntry1] : Patient examined and evaluated at this time.\par Continue local wound care and offloading.\par Ordered bloodwork and radiographs.\par Referral to primary care provider.\par Rx for colchicine and allopurinol sent to pharmacy.\par Spent 30 minutes for patient care and medical decision making.

## 2021-01-04 NOTE — HISTORY OF PRESENT ILLNESS
[FreeTextEntry1] : Patient reports no open wounds.    Patient has a history of gout (8 years) in lower extremities.  Patient was tx at [facility] ER last week for a painful gout attack in his right great toe.  ER prescribed  Colchicine and Prednisone and referred patient to [facility].

## 2021-01-04 NOTE — REVIEW OF SYSTEMS
[Fever] : no fever [Eye Pain] : no eye pain [Earache] : no earache [Chest Pain] : no chest pain [Shortness Of Breath] : no shortness of breath [Cough] : no cough [Abdominal Pain] : no abdominal pain [Skin Wound] : no skin wound [FreeTextEntry9] : right and left foot gout

## 2021-01-04 NOTE — VITALS
[FreeTextEntry3] : Right great toe - patient denies pain at this time 0/10 - 10/10 [FreeTextEntry1] : Colchicine [FreeTextEntry2] : night time [FreeTextEntry4] : Colchicine

## 2021-01-04 NOTE — ASSESSMENT
[Verbal] : Verbal [Patient] : Patient [Good - alert, interested, motivated] : Good - alert, interested, motivated [Verbalizes knowledge/Understanding] : Verbalizes knowledge/understanding [Foot Care] : foot care [Skin Care] : skin care [Signs and symptoms of infection] : sign and symptoms of infection [How and When to Call] : how and when to call [Labs and Tests] : labs and tests [Pain Management] : pain management [Patient responsibility to plan of care] : patient responsibility to plan of care [] : Yes [Stable] : stable [Home] : Home [Ambulatory] : Ambulatory [Not Applicable - Long Term Care/Home Health Agency] : Long Term Care/Home Health Agency: Not Applicable [FreeTextEntry4] : Circulation\par \par Dorsalis Pedis:  bilateral palpable unable to palpate\par Posterior Tibialis:  bilateral palpable\par Doppler Pulses:  bilateral  present\par Extremity Color:  bilateral pink\par Extremity Temperature:  bilateral warm\par Capillary Refill:  bilateral <3 sec\par JOSEPH:   Non-invasive vascular studies not ordered as per DPM\par \par Xray and blood work ordered - patient reports that he will have done tomorrow at [facility] - provided patient with orders\par \par Colchicine and Allopurinol escribed - DPM instructed patient to hold Allopurinol at this time until gout clears up.  Patient verbalized understanding.\par \par Patient reports that his PCP no longer takes his insurance and he is seeking a new PCP.\par \par f/u 2 weeks\par \par

## 2021-01-04 NOTE — PHYSICAL EXAM
[2+] : left 2+ [Skin Ulcer] : no ulcer [de-identified] : A&Ox3, NAD [de-identified] : 5/5 strength in all quadrants bilaterally, no tenderness to palpation and ROM [de-identified] : No erythema, no open lesions, no lacerations, no ecchymosis, no macerations [de-identified] : Light touch sensation intact bilaterally [FreeTextEntry1] : Right great toe - no open wound - resolving gout

## 2021-02-08 ENCOUNTER — APPOINTMENT (OUTPATIENT)
Dept: SURGERY | Facility: HOSPITAL | Age: 36
End: 2021-02-08

## 2021-06-17 ENCOUNTER — EMERGENCY (EMERGENCY)
Facility: HOSPITAL | Age: 36
LOS: 1 days | Discharge: HOME | End: 2021-06-17
Attending: INTERNAL MEDICINE | Admitting: INTERNAL MEDICINE
Payer: MEDICAID

## 2021-06-17 VITALS
DIASTOLIC BLOOD PRESSURE: 74 MMHG | TEMPERATURE: 98 F | HEIGHT: 72 IN | HEART RATE: 87 BPM | WEIGHT: 244.93 LBS | RESPIRATION RATE: 15 BRPM | SYSTOLIC BLOOD PRESSURE: 132 MMHG | OXYGEN SATURATION: 98 %

## 2021-06-17 VITALS
SYSTOLIC BLOOD PRESSURE: 124 MMHG | HEART RATE: 81 BPM | DIASTOLIC BLOOD PRESSURE: 79 MMHG | TEMPERATURE: 98 F | OXYGEN SATURATION: 98 % | RESPIRATION RATE: 16 BRPM

## 2021-06-17 PROCEDURE — 99284 EMERGENCY DEPT VISIT MOD MDM: CPT

## 2021-06-17 PROCEDURE — 99283 EMERGENCY DEPT VISIT LOW MDM: CPT | Mod: 25

## 2021-06-17 PROCEDURE — 96372 THER/PROPH/DIAG INJ SC/IM: CPT

## 2021-06-17 RX ORDER — ALLOPURINOL 300 MG
0 TABLET ORAL
Qty: 0 | Refills: 0 | DISCHARGE

## 2021-06-17 RX ORDER — COLCHICINE 0.6 MG
1.2 TABLET ORAL ONCE
Refills: 0 | Status: COMPLETED | OUTPATIENT
Start: 2021-06-17 | End: 2021-06-17

## 2021-06-17 RX ORDER — KETOROLAC TROMETHAMINE 30 MG/ML
60 SYRINGE (ML) INJECTION ONCE
Refills: 0 | Status: DISCONTINUED | OUTPATIENT
Start: 2021-06-17 | End: 2021-06-17

## 2021-06-17 RX ORDER — INDOMETHACIN 50 MG
1 CAPSULE ORAL
Qty: 7 | Refills: 0
Start: 2021-06-17 | End: 2021-06-23

## 2021-06-17 RX ORDER — COLCHICINE 0.6 MG
1 TABLET ORAL
Qty: 10 | Refills: 0
Start: 2021-06-17 | End: 2021-06-21

## 2021-06-17 RX ADMIN — Medication 60 MILLIGRAM(S): at 04:05

## 2021-06-17 RX ADMIN — Medication 1.2 MILLIGRAM(S): at 04:05

## 2021-06-17 NOTE — ED ADULT NURSE NOTE - NSIMPLEMENTINTERV_GEN_ALL_ED
Implemented All Fall Risk Interventions:  [place] to call system. Call bell, personal items and telephone within reach. Instruct patient to call for assistance. Room bathroom lighting operational. Non-slip footwear when patient is off stretcher. Physically safe environment: no spills, clutter or unnecessary equipment. Stretcher in lowest position, wheels locked, appropriate side rails in place. Provide visual cue, wrist band, yellow gown, etc. Monitor gait and stability. Monitor for mental status changes and reorient to person, place, and time. Review medications for side effects contributing to fall risk. Reinforce activity limits and safety measures with patient and family.

## 2021-06-17 NOTE — ED ADULT NURSE NOTE - CAS TRG GEN SKIN CONDITION
2017  EMPLOYEE INFORMATION: EMPLOYER INFORMATION:   NAME: [employer]   : 1977    DATE OF INJURY/EVENT: 2017           Location: [facility]   Treating Provider: Piyush Cross MD  Time In:  10:14 AM Time Out:  10:34 AM      DIAGNOSIS:   1. Strain of right trapezius muscle, subsequent encounter      STATUS: This injury is determined to be WORK RELATED.    RETURN TO WORK:  Employee may return to work with restrictions.  Return Date: 2017      RESTRICTIONS:   Restrictions are to be followed at work and at home.  Restrictions are in effect until next follow-up visit.  No lift, push or pull more than 10 pounds.  Below shoulder height work    TREATMENT PLAN:  Medications for this injury/condition:   naproxen  Referral/Consult:  Diagnostic Testing:   None         Instructions:   Apply ice or heat to painful area as needed, continue naproxen twice a day as needed for pain    NEXT RETURN VISIT: 17 @ 10:00 AM  Thank you for the privilege of providing medical care for this injury/condition.  If there are any questions, please call the occupational health clinic at Dept: [phone].      Electronically signed on 2017 at 10:34 AM by:   Piyush Cross MD   [facility]  
Warm/Dry

## 2021-06-17 NOTE — ED ADULT NURSE NOTE - OBJECTIVE STATEMENT
patient a/o x 4 with an anxious affect c/o left foot/ankle pain from gout attack.  patient pending pain medication and dispo

## 2021-06-17 NOTE — ED PROVIDER NOTE - OBJECTIVE STATEMENT
Pt states "I'm having a gout attack". c/o left foot pain  pain, foot 36 y/o male h/o Gout C/C Pt states "I'm having a gout attack". c/o left foot pain  no trauma, no fever, no rash no toxemia

## 2021-06-17 NOTE — ED PROVIDER NOTE - PATIENT PORTAL LINK FT
You can access the FollowMyHealth Patient Portal offered by [facility] by registering at the following website: [email or web address]. By joining [employer]’s FollowMyHealth portal, you will also be able to view your health information using other applications (apps) compatible with our system.

## 2021-06-17 NOTE — ED PROVIDER NOTE - CARE PROVIDER_API CALL
Avinash Mccarthy (MD)  Internal Medicine; Rheumatology  [address] [place] [address]  Phone: [phone]  Fax: [phone]  Follow Up Time: 1-3 Days

## 2021-08-09 NOTE — ED PROVIDER NOTE - CPE EDP MUSC NORM
General: pleasant, well developed, well nourished, no acute distress, non ill appearing Eyes- no scleral icterus HENT: normocephalic, atraumatic head, face mask covering mouth and nose Neck: supple, no JVD Chest: normal breath sounds, normal work of breathing Heart: regular rate and rhythm without murmur Abdomen: soft, non tender, non distended, bowel sounds present, no hepatomegaly, no splenomegaly, no ascites, well healing lap incisions x 3, no signs of infection. Musculoskeletal: normal gait and station Extremities: no edema, no asterixis, no palmar erythema Skin: no rashes, no jaundice Neurologic: no focal deficits, alert and oriented x 3 Psychiatric: stable mood, appropriate affect normal...

## 2021-09-11 NOTE — ED PROVIDER NOTE - TEMPLATE, MLM
PT HAD 1 BM ON BEDSIDE COMMODE. PT WATER REPLENISHED. PT IS SATING AT 90%. EDUCATED PT ON 
NEEDS FOR DISCHARGE AND ANSWERED PTS QUESTION. PT REPORTS ALL NEEDS MET AT THIS TIME. Orthopedic

## 2022-02-18 NOTE — ED PROVIDER NOTE - NSFOLLOWUPINSTRUCTIONS_ED_ALL_ED_FT
[FreeTextEntry1] : PT ARRIVED A&OX3\par ALL VITALS WITHIN PARAMETERS FOR HBOT\par PT DESCENDED TO 2.0 RAIMUNDO @ 1.75 PSI/MIN IN CHAMBER #4  WITHOUT INCIDENT\par PT RESTING AT  TX DEPTH WITH VISIBLE CHEST RISE AND FALL OBSERVED CHAMBER SIDE \par PT ASCENDED FROM 2.0 RAIMUNDO @ 1.75 PSI/MIN WITHOUT INCIDENT\par PT TOLERATED TX WELL\par  Gout    AMBULATORY CARE:    Gout is a form of arthritis that causes severe joint pain and stiffness. Acute gout pain starts suddenly, gets worse quickly, and stops on its own. Acute gout can become chronic and cause permanent damage to your joints.     Seek care immediately if:   •You have severe pain in one or more of your joints that you cannot tolerate.       •You have a fever or redness that spreads beyond the joint area.      Call your doctor if:   •You have new symptoms, such as a rash, after you start gout treatment.       •Your joint pain and swelling do not go away, even after treatment.      •You are not urinating as much or as often as you usually do.       •You have trouble taking your gout medicines.      •You have questions or concerns about your condition or care.      Stages of gout:   •Hyperuricemia starts with high levels of uric acid. Hyperuricemia is not gout, but it increases your risk for gout. You may have no symptoms at this stage, and it usually does not need treatment.       •Acute gouty arthritis starts with a sudden attack of pain and swelling, usually in 1 joint. The attack may last from a few days to 2 weeks.       •Intercritical gout is the time between attacks. You may go months or years without another attack. You will not have joint pain or stiffness, but this does not mean your gout is cured. You will still need treatment to prevent chronic gout.      •Chronic tophaceous gout develops if gout is not treated. Large amounts of uric acid crystals, called tophi, collect around your joints. The crystals can destroy or deform the joints. Gout attacks occur more often, and last hours to weeks. More than 1 joint may be painful and swollen. At this stage, gout symptoms do not go away on their own.       Medicines: You may need any of the following:   •Prescription pain medicine may be given. Ask your healthcare provider how to take this medicine safely. Some prescription pain medicines contain acetaminophen. Do not take other medicines that contain acetaminophen without talking to your healthcare provider. Too much acetaminophen may cause liver damage. Prescription pain medicine may cause constipation. Ask your healthcare provider how to prevent or treat constipation.       •NSAIDs, such as ibuprofen, help decrease swelling, pain, and fever. This medicine is available with or without a doctor's order. NSAIDs can cause stomach bleeding or kidney problems in certain people. If you take blood thinner medicine, always ask your healthcare provider if NSAIDs are safe for you. Always read the medicine label and follow directions.      •Gout medicine decreases joint pain and swelling. It may also be given to prevent new gout attacks.       •Steroids reduce inflammation and can help your joint stiffness and pain during gout attacks.      •Uric acid medicine may be given to reduce the amount of uric acid your body makes. Some medicines may help you pass more uric acid when you urinate.       •Take your medicine as directed. Contact your healthcare provider if you think your medicine is not helping or if you have side effects. Tell him or her if you are allergic to any medicine. Keep a list of the medicines, vitamins, and herbs you take. Include the amounts, and when and why you take them. Bring the list or the pill bottles to follow-up visits. Carry your medicine list with you in case of an emergency.      Manage your symptoms:   •Rest your painful joint so it can heal. Your healthcare provider may recommend crutches or a walker if the affected joint is in a leg.       •Apply ice to your joint. Ice decreases pain and swelling. Use an ice pack, or put crushed ice in a plastic bag. Cover the ice pack or bag with a towel before you apply it to your painful joint. Apply ice for 15 to 20 minutes every hour, or as directed.      •Elevate your joint. Elevation helps reduce swelling and pain. Raise your joint above the level of your heart as often as you can. Prop your painful joint on pillows to keep it above your heart comfortably.      •Go to physical therapy if directed. A physical therapist can teach you exercises to improve flexibility and range of motion.       Help prevent gout attacks:   •Do not eat high-purine foods. These foods include meats, seafood, asparagus, spinach, cauliflower, and some types of beans. Healthcare providers may tell you to eat more low-fat milk products, such as yogurt. Milk products may decrease your risk for gout attacks. Vitamin C and coffee may also help. Your healthcare provider or dietitian can help you create a meal plan.      •Drink liquids as directed. Liquids such as water help remove uric acid from your body. Ask how much liquid to drink each day and which liquids are best for you.      •Maintain a healthy weight. Weight loss may decrease the amount of uric acid in your body. Ask your healthcare provider how much you should weigh. Ask him to help you create a weight loss plan if you are overweight.       •Control your blood sugar level if you have diabetes. Keep your blood sugar level in a normal range. This can help prevent gout attacks.       •Limit or do not drink alcohol as directed. Alcohol can trigger a gout attack. Alcohol also increases your risk for dehydration. Ask your healthcare provider if alcohol is safe for you.

## 2022-08-22 NOTE — ED PROVIDER NOTE - SIGNIFICANT NEGATIVE FINDINGS
[Antalgic] : antalgic [de-identified] : Examination of the lumbar spine reveals no midline tenderness palpation, step-offs, or skin lesions. Decreased range of motion with respect to flexion, extension, lateral bending, and rotation. No tenderness to palpation of the sciatic notch. No tenderness palpation of the bilateral greater trochanters. No pain with passive internal/external rotation of the hips. No instability of bilateral lower extremities.  Negative TOMMY. Negative straight leg raise bilaterally. No bowstring. Negative femoral stretch. 5 out of 5 iliopsoas, hip abductors, hips adductors, quadriceps, hamstrings, gastrocsoleus, tibialis anterior, extensor hallucis longus, peroneals. Grossly intact sensation to light touch bilateral lower extremities. 1+ patellar and Achilles reflexes. Downgoing Babinski. No clonus. Intact proprioception. Palpable pulses. No skin lesion and no edema on the right and left lower extremities. [de-identified] : Review of his lumbar spine MRI by me does reveal multilevel stenosis most pronounced at L4-S1.  He not only has central lateral recess stenosis but significant foraminal collapse no headache, no chest pain, no SOB, no palpitations, no n/v,  no neuro changes.

## 2022-10-01 NOTE — ED PROVIDER NOTE - CHPI ED SYMPTOMS POS
Rx Refill Note  Requested Prescriptions     Pending Prescriptions Disp Refills   • citalopram (CeleXA) 40 MG tablet 90 tablet 0     Sig: Take 1 tablet by mouth Daily.   • pantoprazole (PROTONIX) 40 MG EC tablet 90 tablet 0     Sig: Take 1 tablet by mouth Daily.      Last office visit with prescribing clinician: Visit date not found      Next office visit with prescribing clinician: Visit date not found            Anders Caballero MA  10/01/22, 11:58 EDT   INFLAMMATION/PAIN

## 2023-05-03 NOTE — ED ADULT NURSE NOTE - NS ED NURSE DC INFO COMPLEXITY
Scalpel Size: 15 blade Returned Demonstration/Simple: Patient demonstrates quick and easy understanding

## 2024-02-12 NOTE — ED ADULT NURSE NOTE - CAS EDP DISCH TYPE
I personally performed the service described in the documentation recorded by the scribe in my presence, and it accurately and completely records my words and actions.
Home

## 2024-04-26 NOTE — ED PROVIDER NOTE - MEDICAL DECISION MAKING DETAILS
173
PROBABLE GOUT FLARE. NO ERYTHEMA. NO FEVER. WILL DO X RAY AND TX WITH COLCHICINE, INDOCIN AND PREDNISONE.

## 2024-06-17 NOTE — ED ADULT NURSE NOTE - WEIGHT BEARING STATUS MAINTAINED
Dapsone Counseling: I discussed with the patient the risks of dapsone including but not limited to hemolytic anemia, agranulocytosis, rashes, methemoglobinemia, kidney failure, peripheral neuropathy, headaches, GI upset, and liver toxicity.  Patients who start dapsone require monitoring including baseline LFTs and weekly CBCs for the first month, then every month thereafter.  The patient verbalized understanding of the proper use and possible adverse effects of dapsone.  All of the patient's questions and concerns were addressed. Detail Level: Zone Topical Sulfur Applications Pregnancy And Lactation Text: This medication is Pregnancy Category C and has an unknown safety profile during pregnancy. It is unknown if this topical medication is excreted in breast milk. Topical Sulfur Applications Counseling: Topical Sulfur Counseling: Patient counseled that this medication may cause skin irritation or allergic reactions.  In the event of skin irritation, the patient was advised to reduce the amount of the drug applied or use it less frequently.   The patient verbalized understanding of the proper use and possible adverse effects of topical sulfur application.  All of the patient's questions and concerns were addressed. Sarecycline Counseling: Patient advised regarding possible photosensitivity and discoloration of the teeth, skin, lips, tongue and gums.  Patient instructed to avoid sunlight, if possible.  When exposed to sunlight, patients should wear protective clothing, sunglasses, and sunscreen.  The patient was instructed to call the office immediately if the following severe adverse effects occur:  hearing changes, easy bruising/bleeding, severe headache, or vision changes.  The patient verbalized understanding of the proper use and possible adverse effects of sarecycline.  All of the patient's questions and concerns were addressed. Benzoyl Peroxide Counseling: Patient counseled that medicine may cause skin irritation and bleach clothing.  In the event of skin irritation, the patient was advised to reduce the amount of the drug applied or use it less frequently.   The patient verbalized understanding of the proper use and possible adverse effects of benzoyl peroxide.  All of the patient's questions and concerns were addressed. High Dose Vitamin A Counseling: Side effects reviewed, pt to contact office should one occur. Doxycycline Counseling:  Patient counseled regarding possible photosensitivity and increased risk for sunburn.  Patient instructed to avoid sunlight, if possible.  When exposed to sunlight, patients should wear protective clothing, sunglasses, and sunscreen.  The patient was instructed to call the office immediately if the following severe adverse effects occur:  hearing changes, easy bruising/bleeding, severe headache, or vision changes.  The patient verbalized understanding of the proper use and possible adverse effects of doxycycline.  All of the patient's questions and concerns were addressed. Doxycycline Pregnancy And Lactation Text: This medication is Pregnancy Category D and not consider safe during pregnancy. It is also excreted in breast milk but is considered safe for shorter treatment courses. Azelaic Acid Pregnancy And Lactation Text: This medication is considered safe during pregnancy and breast feeding. Erythromycin Pregnancy And Lactation Text: This medication is Pregnancy Category B and is considered safe during pregnancy. It is also excreted in breast milk. Topical Retinoid Pregnancy And Lactation Text: This medication is Pregnancy Category C. It is unknown if this medication is excreted in breast milk. Azithromycin Pregnancy And Lactation Text: This medication is considered safe during pregnancy and is also secreted in breast milk. Tazorac Counseling:  Patient advised that medication is irritating and drying.  Patient may need to apply sparingly and wash off after an hour before eventually leaving it on overnight.  The patient verbalized understanding of the proper use and possible adverse effects of tazorac.  All of the patient's questions and concerns were addressed. Topical Clindamycin Counseling: Patient counseled that this medication may cause skin irritation or allergic reactions.  In the event of skin irritation, the patient was advised to reduce the amount of the drug applied or use it less frequently.   The patient verbalized understanding of the proper use and possible adverse effects of clindamycin.  All of the patient's questions and concerns were addressed. Birth Control Pills Pregnancy And Lactation Text: This medication should be avoided if pregnant and for the first 30 days post-partum. Winlevi Counseling:  I discussed with the patient the risks of topical clascoterone including but not limited to erythema, scaling, itching, and stinging. Patient voiced their understanding. Erythromycin Counseling:  I discussed with the patient the risks of erythromycin including but not limited to GI upset, allergic reaction, drug rash, diarrhea, increase in liver enzymes, and yeast infections. Birth Control Pills Counseling: Birth Control Pill Counseling: I discussed with the patient the potential side effects of OCPs including but not limited to increased risk of stroke, heart attack, thrombophlebitis, deep venous thrombosis, hepatic adenomas, breast changes, GI upset, headaches, and depression.  The patient verbalized understanding of the proper use and possible adverse effects of OCPs. All of the patient's questions and concerns were addressed. Winlevi Pregnancy And Lactation Text: This medication is considered safe during pregnancy and breastfeeding. Bactrim Pregnancy And Lactation Text: This medication is Pregnancy Category D and is known to cause fetal risk.  It is also excreted in breast milk. Bactrim Counseling:  I discussed with the patient the risks of sulfa antibiotics including but not limited to GI upset, allergic reaction, drug rash, diarrhea, dizziness, photosensitivity, and yeast infections.  Rarely, more serious reactions can occur including but not limited to aplastic anemia, agranulocytosis, methemoglobinemia, blood dyscrasias, liver or kidney failure, lung infiltrates or desquamative/blistering drug rashes. Isotretinoin Pregnancy And Lactation Text: This medication is Pregnancy Category X and is considered extremely dangerous during pregnancy. It is unknown if it is excreted in breast milk. Include Pregnancy/Lactation Warning?: No Azithromycin Counseling:  I discussed with the patient the risks of azithromycin including but not limited to GI upset, allergic reaction, drug rash, diarrhea, and yeast infections. Benzoyl Peroxide Pregnancy And Lactation Text: This medication is Pregnancy Category C. It is unknown if benzoyl peroxide is excreted in breast milk. Tetracycline Counseling: Patient counseled regarding possible photosensitivity and increased risk for sunburn.  Patient instructed to avoid sunlight, if possible.  When exposed to sunlight, patients should wear protective clothing, sunglasses, and sunscreen.  The patient was instructed to call the office immediately if the following severe adverse effects occur:  hearing changes, easy bruising/bleeding, severe headache, or vision changes.  The patient verbalized understanding of the proper use and possible adverse effects of tetracycline.  All of the patient's questions and concerns were addressed. Patient understands to avoid pregnancy while on therapy due to potential birth defects. Minocycline Pregnancy And Lactation Text: This medication is Pregnancy Category D and not consider safe during pregnancy. It is also excreted in breast milk. Spironolactone Counseling: Patient advised regarding risks of diarrhea, abdominal pain, hyperkalemia, birth defects (for female patients), liver toxicity and renal toxicity. The patient may need blood work to monitor liver and kidney function and potassium levels while on therapy. The patient verbalized understanding of the proper use and possible adverse effects of spironolactone.  All of the patient's questions and concerns were addressed. Minocycline Counseling: Patient advised regarding possible photosensitivity and discoloration of the teeth, skin, lips, tongue and gums.  Patient instructed to avoid sunlight, if possible.  When exposed to sunlight, patients should wear protective clothing, sunglasses, and sunscreen.  The patient was instructed to call the office immediately if the following severe adverse effects occur:  hearing changes, easy bruising/bleeding, severe headache, or vision changes.  The patient verbalized understanding of the proper use and possible adverse effects of minocycline.  All of the patient's questions and concerns were addressed. Spironolactone Pregnancy And Lactation Text: This medication can cause feminization of the male fetus and should be avoided during pregnancy. The active metabolite is also found in breast milk. High Dose Vitamin A Pregnancy And Lactation Text: High dose vitamin A therapy is contraindicated during pregnancy and breast feeding. Azelaic Acid Counseling: Patient counseled that medicine may cause skin irritation and to avoid applying near the eyes.  In the event of skin irritation, the patient was advised to reduce the amount of the drug applied or use it less frequently.   The patient verbalized understanding of the proper use and possible adverse effects of azelaic acid.  All of the patient's questions and concerns were addressed. Isotretinoin Counseling: Patient should get monthly blood tests, not donate blood, not drive at night if vision affected, not share medication, and not undergo elective surgery for 6 months after tx completed. Side effects reviewed, pt to contact office should one occur. Topical Retinoid counseling:  Patient advised to apply a pea-sized amount only at bedtime and wait 30 minutes after washing their face before applying.  If too drying, patient may add a non-comedogenic moisturizer. The patient verbalized understanding of the proper use and possible adverse effects of retinoids.  All of the patient's questions and concerns were addressed. Dapsone Pregnancy And Lactation Text: This medication is Pregnancy Category C and is not considered safe during pregnancy or breast feeding. Topical Clindamycin Pregnancy And Lactation Text: This medication is Pregnancy Category B and is considered safe during pregnancy. It is unknown if it is excreted in breast milk. partial weight bearing left Tazorac Pregnancy And Lactation Text: This medication is not safe during pregnancy. It is unknown if this medication is excreted in breast milk. Aklief counseling:  Patient advised to apply a pea-sized amount only at bedtime and wait 30 minutes after washing their face before applying.  If too drying, patient may add a non-comedogenic moisturizer.  The most commonly reported side effects including irritation, redness, scaling, dryness, stinging, burning, itching, and increased risk of sunburn.  The patient verbalized understanding of the proper use and possible adverse effects of retinoids.  All of the patient's questions and concerns were addressed. Aklief Pregnancy And Lactation Text: It is unknown if this medication is safe to use during pregnancy.  It is unknown if this medication is excreted in breast milk.  Breastfeeding women should use the topical cream on the smallest area of the skin for the shortest time needed while breastfeeding.  Do not apply to nipple and areola.

## 2024-06-26 NOTE — ED ADULT NURSE NOTE - CAS DISCH BELONGINGS RETURNED
None
Additional Notes: Frannie special , $11 a unit.
Detail Level: Simple
Render Risk Assessment In Note?: no
Not applicable

## 2024-07-06 ENCOUNTER — EMERGENCY (EMERGENCY)
Facility: HOSPITAL | Age: 39
LOS: 1 days | Discharge: HOME | End: 2024-07-06
Attending: EMERGENCY MEDICINE | Admitting: STUDENT IN AN ORGANIZED HEALTH CARE EDUCATION/TRAINING PROGRAM
Payer: MEDICAID

## 2024-07-06 VITALS
SYSTOLIC BLOOD PRESSURE: 119 MMHG | HEIGHT: 69 IN | HEART RATE: 92 BPM | RESPIRATION RATE: 16 BRPM | DIASTOLIC BLOOD PRESSURE: 66 MMHG | WEIGHT: 220.02 LBS | OXYGEN SATURATION: 97 % | TEMPERATURE: 98 F

## 2024-07-06 PROCEDURE — 99284 EMERGENCY DEPT VISIT MOD MDM: CPT | Mod: 25

## 2024-07-06 RX ORDER — METHYLPREDNISOLONE ACETATE 20 MG/ML
125 VIAL (ML) INJECTION ONCE
Refills: 0 | Status: COMPLETED | OUTPATIENT
Start: 2024-07-06 | End: 2024-07-06

## 2024-07-06 RX ORDER — FAMOTIDINE 40 MG
20 TABLET ORAL ONCE
Refills: 0 | Status: COMPLETED | OUTPATIENT
Start: 2024-07-06 | End: 2024-07-06

## 2024-07-06 RX ADMIN — Medication 125 MILLIGRAM(S): at 23:46

## 2024-07-06 RX ADMIN — Medication 20 MILLIGRAM(S): at 23:41

## 2024-07-07 VITALS
RESPIRATION RATE: 16 BRPM | DIASTOLIC BLOOD PRESSURE: 67 MMHG | SYSTOLIC BLOOD PRESSURE: 102 MMHG | OXYGEN SATURATION: 98 % | TEMPERATURE: 98 F | HEART RATE: 70 BPM

## 2024-07-07 PROCEDURE — 96374 THER/PROPH/DIAG INJ IV PUSH: CPT

## 2024-07-07 PROCEDURE — 99284 EMERGENCY DEPT VISIT MOD MDM: CPT | Mod: 25

## 2024-07-07 PROCEDURE — 96375 TX/PRO/DX INJ NEW DRUG ADDON: CPT

## 2024-07-07 RX ORDER — EPINEPHRINE 0.3 MG/.3ML
0.3 INJECTION SUBCUTANEOUS
Qty: 1 | Refills: 0
Start: 2024-07-07

## 2024-07-07 RX ORDER — FAMOTIDINE 40 MG
1 TABLET ORAL
Qty: 10 | Refills: 0
Start: 2024-07-07 | End: 2024-07-11

## 2024-07-07 RX ORDER — PREDNISONE 10 MG/1
1 TABLET ORAL
Qty: 4 | Refills: 0
Start: 2024-07-07 | End: 2024-07-10

## 2024-07-07 RX ORDER — ONDANSETRON HYDROCHLORIDE 2 MG/ML
4 INJECTION INTRAMUSCULAR; INTRAVENOUS ONCE
Refills: 0 | Status: COMPLETED | OUTPATIENT
Start: 2024-07-07 | End: 2024-07-07

## 2024-07-07 RX ADMIN — ONDANSETRON HYDROCHLORIDE 4 MILLIGRAM(S): 2 INJECTION INTRAMUSCULAR; INTRAVENOUS at 02:50

## 2025-02-09 ENCOUNTER — EMERGENCY (EMERGENCY)
Facility: HOSPITAL | Age: 40
LOS: 1 days | Discharge: HOME | End: 2025-02-09
Attending: EMERGENCY MEDICINE | Admitting: EMERGENCY MEDICINE
Payer: MEDICAID

## 2025-02-09 VITALS
DIASTOLIC BLOOD PRESSURE: 74 MMHG | SYSTOLIC BLOOD PRESSURE: 121 MMHG | HEART RATE: 72 BPM | OXYGEN SATURATION: 97 % | TEMPERATURE: 98 F | RESPIRATION RATE: 16 BRPM

## 2025-02-09 VITALS
HEIGHT: 72 IN | DIASTOLIC BLOOD PRESSURE: 83 MMHG | SYSTOLIC BLOOD PRESSURE: 114 MMHG | TEMPERATURE: 98 F | RESPIRATION RATE: 18 BRPM | HEART RATE: 83 BPM | OXYGEN SATURATION: 98 % | WEIGHT: 214.95 LBS

## 2025-02-09 LAB
ALBUMIN SERPL ELPH-MCNC: 3.1 G/DL — LOW (ref 3.3–5)
ALP SERPL-CCNC: 63 U/L — SIGNIFICANT CHANGE UP (ref 40–120)
ALT FLD-CCNC: 31 U/L — SIGNIFICANT CHANGE UP (ref 12–78)
ANION GAP SERPL CALC-SCNC: 4 MMOL/L — LOW (ref 5–17)
AST SERPL-CCNC: 30 U/L — SIGNIFICANT CHANGE UP (ref 15–37)
BASOPHILS # BLD AUTO: 0.03 K/UL — SIGNIFICANT CHANGE UP (ref 0–0.2)
BASOPHILS NFR BLD AUTO: 0.2 % — SIGNIFICANT CHANGE UP (ref 0–2)
BILIRUB SERPL-MCNC: 0.6 MG/DL — SIGNIFICANT CHANGE UP (ref 0.2–1.2)
BUN SERPL-MCNC: 10 MG/DL — SIGNIFICANT CHANGE UP (ref 7–23)
CALCIUM SERPL-MCNC: 8.7 MG/DL — SIGNIFICANT CHANGE UP (ref 8.5–10.1)
CHLORIDE SERPL-SCNC: 104 MMOL/L — SIGNIFICANT CHANGE UP (ref 96–108)
CO2 SERPL-SCNC: 30 MMOL/L — SIGNIFICANT CHANGE UP (ref 22–31)
CREAT SERPL-MCNC: 1.1 MG/DL — SIGNIFICANT CHANGE UP (ref 0.5–1.3)
EGFR: 88 ML/MIN/1.73M2 — SIGNIFICANT CHANGE UP
EOSINOPHIL # BLD AUTO: 0.01 K/UL — SIGNIFICANT CHANGE UP (ref 0–0.5)
EOSINOPHIL NFR BLD AUTO: 0.1 % — SIGNIFICANT CHANGE UP (ref 0–6)
FLUAV AG NPH QL: DETECTED
FLUBV AG NPH QL: SIGNIFICANT CHANGE UP
GLUCOSE SERPL-MCNC: 105 MG/DL — HIGH (ref 70–99)
HCT VFR BLD CALC: 40.7 % — SIGNIFICANT CHANGE UP (ref 39–50)
HGB BLD-MCNC: 14.3 G/DL — SIGNIFICANT CHANGE UP (ref 13–17)
IMM GRANULOCYTES NFR BLD AUTO: 0.4 % — SIGNIFICANT CHANGE UP (ref 0–0.9)
LIDOCAIN IGE QN: 18 U/L — SIGNIFICANT CHANGE UP (ref 13–75)
LYMPHOCYTES # BLD AUTO: 1.26 K/UL — SIGNIFICANT CHANGE UP (ref 1–3.3)
LYMPHOCYTES # BLD AUTO: 9.2 % — LOW (ref 13–44)
MAGNESIUM SERPL-MCNC: 2.7 MG/DL — HIGH (ref 1.6–2.6)
MCHC RBC-ENTMCNC: 28.1 PG — SIGNIFICANT CHANGE UP (ref 27–34)
MCHC RBC-ENTMCNC: 35.1 G/DL — SIGNIFICANT CHANGE UP (ref 32–36)
MCV RBC AUTO: 80.1 FL — SIGNIFICANT CHANGE UP (ref 80–100)
MONOCYTES # BLD AUTO: 1.59 K/UL — HIGH (ref 0–0.9)
MONOCYTES NFR BLD AUTO: 11.6 % — SIGNIFICANT CHANGE UP (ref 2–14)
NEUTROPHILS # BLD AUTO: 10.72 K/UL — HIGH (ref 1.8–7.4)
NEUTROPHILS NFR BLD AUTO: 78.5 % — HIGH (ref 43–77)
NRBC # BLD: 0 /100 WBCS — SIGNIFICANT CHANGE UP (ref 0–0)
NRBC BLD-RTO: 0 /100 WBCS — SIGNIFICANT CHANGE UP (ref 0–0)
PLATELET # BLD AUTO: 186 K/UL — SIGNIFICANT CHANGE UP (ref 150–400)
POTASSIUM SERPL-MCNC: 3.1 MMOL/L — LOW (ref 3.5–5.3)
POTASSIUM SERPL-SCNC: 3.1 MMOL/L — LOW (ref 3.5–5.3)
PROT SERPL-MCNC: 6.9 G/DL — SIGNIFICANT CHANGE UP (ref 6–8.3)
RBC # BLD: 5.08 M/UL — SIGNIFICANT CHANGE UP (ref 4.2–5.8)
RBC # FLD: 12.1 % — SIGNIFICANT CHANGE UP (ref 10.3–14.5)
RSV RNA NPH QL NAA+NON-PROBE: SIGNIFICANT CHANGE UP
S PYO DNA THROAT QL NAA+PROBE: SIGNIFICANT CHANGE UP
SARS-COV-2 RNA SPEC QL NAA+PROBE: SIGNIFICANT CHANGE UP
SODIUM SERPL-SCNC: 138 MMOL/L — SIGNIFICANT CHANGE UP (ref 135–145)
WBC # BLD: 13.67 K/UL — HIGH (ref 3.8–10.5)
WBC # FLD AUTO: 13.67 K/UL — HIGH (ref 3.8–10.5)

## 2025-02-09 PROCEDURE — 85025 COMPLETE CBC W/AUTO DIFF WBC: CPT

## 2025-02-09 PROCEDURE — 87798 DETECT AGENT NOS DNA AMP: CPT

## 2025-02-09 PROCEDURE — 83735 ASSAY OF MAGNESIUM: CPT

## 2025-02-09 PROCEDURE — 36415 COLL VENOUS BLD VENIPUNCTURE: CPT

## 2025-02-09 PROCEDURE — 99284 EMERGENCY DEPT VISIT MOD MDM: CPT

## 2025-02-09 PROCEDURE — 80053 COMPREHEN METABOLIC PANEL: CPT

## 2025-02-09 PROCEDURE — 99285 EMERGENCY DEPT VISIT HI MDM: CPT | Mod: 25

## 2025-02-09 PROCEDURE — 71046 X-RAY EXAM CHEST 2 VIEWS: CPT | Mod: 26

## 2025-02-09 PROCEDURE — 87651 STREP A DNA AMP PROBE: CPT

## 2025-02-09 PROCEDURE — 83690 ASSAY OF LIPASE: CPT

## 2025-02-09 PROCEDURE — 71046 X-RAY EXAM CHEST 2 VIEWS: CPT

## 2025-02-09 PROCEDURE — 93010 ELECTROCARDIOGRAM REPORT: CPT

## 2025-02-09 PROCEDURE — 93005 ELECTROCARDIOGRAM TRACING: CPT

## 2025-02-09 PROCEDURE — 87637 SARSCOV2&INF A&B&RSV AMP PRB: CPT

## 2025-02-09 PROCEDURE — 96375 TX/PRO/DX INJ NEW DRUG ADDON: CPT

## 2025-02-09 PROCEDURE — 96374 THER/PROPH/DIAG INJ IV PUSH: CPT

## 2025-02-09 RX ORDER — POTASSIUM CHLORIDE 750 MG/1
40 TABLET, EXTENDED RELEASE ORAL ONCE
Refills: 0 | Status: COMPLETED | OUTPATIENT
Start: 2025-02-09 | End: 2025-02-09

## 2025-02-09 RX ORDER — ONDANSETRON 4 MG/1
4 TABLET, ORALLY DISINTEGRATING ORAL ONCE
Refills: 0 | Status: COMPLETED | OUTPATIENT
Start: 2025-02-09 | End: 2025-02-09

## 2025-02-09 RX ORDER — KETOROLAC TROMETHAMINE 10 MG
30 TABLET ORAL ONCE
Refills: 0 | Status: DISCONTINUED | OUTPATIENT
Start: 2025-02-09 | End: 2025-02-09

## 2025-02-09 RX ORDER — BACTERIOSTATIC SODIUM CHLORIDE 0.9 %
1000 VIAL (ML) INJECTION ONCE
Refills: 0 | Status: COMPLETED | OUTPATIENT
Start: 2025-02-09 | End: 2025-02-09

## 2025-02-09 RX ORDER — ACETAMINOPHEN 160 MG/5ML
1000 SUSPENSION ORAL ONCE
Refills: 0 | Status: COMPLETED | OUTPATIENT
Start: 2025-02-09 | End: 2025-02-09

## 2025-02-09 RX ORDER — ONDANSETRON 4 MG/1
1 TABLET, ORALLY DISINTEGRATING ORAL
Qty: 15 | Refills: 0
Start: 2025-02-09 | End: 2025-02-13

## 2025-02-09 RX ADMIN — Medication 2000 MILLILITER(S): at 17:35

## 2025-02-09 RX ADMIN — Medication 30 MILLIGRAM(S): at 17:36

## 2025-02-09 RX ADMIN — POTASSIUM CHLORIDE 40 MILLIEQUIVALENT(S): 750 TABLET, EXTENDED RELEASE ORAL at 20:10

## 2025-02-09 RX ADMIN — ACETAMINOPHEN 400 MILLIGRAM(S): 160 SUSPENSION ORAL at 17:35

## 2025-02-09 RX ADMIN — Medication 100 MILLIGRAM(S): at 17:36

## 2025-02-09 RX ADMIN — ONDANSETRON 4 MILLIGRAM(S): 4 TABLET, ORALLY DISINTEGRATING ORAL at 17:35

## 2025-02-09 NOTE — ED PROVIDER NOTE - PATIENT PORTAL LINK FT
You can access the FollowMyHealth Patient Portal offered by [facility] by registering at the following website: [email or web address]. By joining [facility]’s FollowMyHealth portal, you will also be able to view your health information using other applications (apps) compatible with our system.

## 2025-02-09 NOTE — ED PROVIDER NOTE - CARE PROVIDER_API CALL
Herberth Brewster  [address] [address]  Phone: [phone]  Fax: [phone]  Follow Up Time: Urgent

## 2025-02-09 NOTE — ED ADULT NURSE NOTE - OBJECTIVE STATEMENT
a/ox4 patient came to ED c/o fever, cough, body aches, weakness. Patient states symptoms worsened yesterday, States he noticed blood in his sputum when he was coughing. Patient endorses nausea. Pending further labs and radiology reports.

## 2025-02-09 NOTE — ED PROVIDER NOTE - OBJECTIVE STATEMENT
Patient is a 39-year-old male with no significant past medical history presents with fever for 3 days.  Patient reports fever cough vomiting and bodyaches.  Patient took Tylenol, last dose was yesterday, and NyQuil and over-the-counter medications which provided no relief.  Patient recently had a large family gathering.  Patient denies headache abdominal pain shortness of breath diarrhea dysuria rash

## 2025-02-09 NOTE — ED PROVIDER NOTE - NSFOLLOWUPINSTRUCTIONS_ED_ALL_ED_FT
1. FOLLOW UP WITH YOUR PRIMARY DOCTOR IN 24-48 HOURS.   2. FOLLOW UP WITH ALL SPECIALIST DISCUSSED DURING YOUR VISIT.   3. TAKE ALL MEDICATIONS PRESCRIBED IN THE ER IF ANY ARE PRESCRIBED. CONTINUE YOUR HOME MEDICATIONS UNLESS OTHERWISE ADVISED DIFFERENTLY.   4. RETURN FOR WORSENING SYMPTOMS OR CONCERNS INCLUDING BUT NOT LIMITED TO FEVER, CHEST PAIN, OR TROUBLE BREATHING OR ANY OTHER CONCERNS  tylenol and ibuprofen for ever   zofran for nausea     Influenza    WHAT YOU NEED TO KNOW:    Influenza (the flu) is a viral infection of the lungs and airways. The virus spreads through droplets in the air when someone with flu coughs or sneezes. You may also get the virus through close contact with someone who has the flu. For example, a person with the virus on his or her hands can spread it by shaking hands with someone. You may spread the flu to others for 1 week or longer after signs or symptoms appear.    DISCHARGE INSTRUCTIONS:    Call your local emergency number (911 in the ) if:    You have trouble breathing, and your lips look purple or blue.    You have a seizure.  Seek care immediately if:    You are dizzy, or you are urinating less or not at all.    You have a headache with a stiff neck, and you feel tired or confused.    You have new pain or pressure in your chest.    Your symptoms, such as shortness of breath, vomiting, or diarrhea, get worse.    Your symptoms, such as fever and coughing, seem to get better, but then get worse.  Call your doctor if:    You have new muscle pain or weakness.    You have questions or concerns about your condition or care.  Medicines: You may need any of the following:    Acetaminophen decreases pain and fever. It is available without a doctor's order. Ask how much to take and how often to take it. Follow directions. Read the labels of all other medicines you are using to see if they also contain acetaminophen, or ask your doctor or pharmacist. Acetaminophen can cause liver damage if not taken correctly.    NSAIDs, such as ibuprofen, help decrease swelling, pain, and fever. This medicine is available with or without a doctor's order. NSAIDs can cause stomach bleeding or kidney problems in certain people. If you take blood thinner medicine, always ask your healthcare provider if NSAIDs are safe for you. Always read the medicine label and follow directions.    Antivirals help treat viral infections.    Take your medicine as directed. Contact your healthcare provider if you think your medicine is not helping or if you have side effects. Tell your provider if you are allergic to any medicine. Keep a list of the medicines, vitamins, and herbs you take. Include the amounts, and when and why you take them. Bring the list or the pill bottles to follow-up visits. Carry your medicine list with you in case of an emergency.  Rest as much as you can to help you recover.    Drink liquids as directed to help prevent dehydration. Ask how much liquid to drink each day and which liquids are best for you.    Gargle with warm salt water to soothe your sore throat. Your healthcare provider may also recommend throat lozenges or sprays.    Use a cool mist humidifier or vaporizer to ease your breathing and unplug your nose.  Humidifier    Prevent the spread of germs:      Wash your hands often. Wash your hands several times each day. Wash after you use the bathroom, change a child's diaper, and before you prepare or eat food. Use soap and water every time. Rub your soapy hands together, lacing your fingers. Wash the front and back of your hands, and in between your fingers. Use the fingers of one hand to scrub under the fingernails of the other hand. Wash for at least 20 seconds. Rinse with warm, running water for several seconds. Then dry your hands with a clean towel or paper towel. Use hand  that contains alcohol if soap and water are not available. Do not touch your eyes, nose, or mouth without washing your hands first.  Handwashing      Cover a sneeze or cough. Use a tissue that covers your mouth and nose. Throw the tissue away in a trash can right away. Use the bend of your arm if a tissue is not available. Wash your hands well with soap and water or use hand .    Stay home if you are sick. Avoid crowds during the first 1 to 4 days of illness.    Ask about vaccines you may need. Talk to your healthcare provider about your vaccine history. Your provider can tell you which vaccines you need, and when to get them.  Get the flu vaccine as soon as recommended. The vaccine may be available starting in September or October. Flu viruses change, so it is important to get a flu vaccine every year.    Get a COVID-19 vaccine as recommended. Vaccination is recommended for everyone 6 months or older. Your provider can tell you when and how often to get booster doses.    Get the pneumonia vaccine if recommended. This vaccine is usually recommended every 5 years. Your provider will tell you when to get this vaccine, if needed.  Follow up with your doctor as directed: Write down your questions so you remember to ask them during your visits.    © [employer] 1973, 2025    	  back to top            © [employer] 1973, 2025

## 2025-02-09 NOTE — ED ADULT NURSE NOTE - NSFALLUNIVINTERV_ED_ALL_ED
Bed/Stretcher in lowest position, wheels locked, appropriate side rails in place/Call bell, personal items and telephone in reach/Instruct patient to call for assistance before getting out of bed/chair/stretcher/Non-slip footwear applied when patient is off stretcher/[place] to call system/Physically safe environment - no spills, clutter or unnecessary equipment/Purposeful proactive rounding/Room/bathroom lighting operational, light cord in reach

## 2025-02-09 NOTE — ED PROVIDER NOTE - PROGRESS NOTE DETAILS
pcp follow up zofran and tylenol and iburpofen All imaging and labs reviewed. all results reviewed with pt including abnormal results. pt given a copy of results. pt advised to follow up with pmd regarding abnormal results. All questions answered and concerns addressed. pt verbalized understanding and agreement with plan and dx. pt advised on next step and when/where to follow up. pt advised on all take home and otc medications. pt advised to follow up with PMD. pt advised to return to ed for worsenng symptoms including fever, cp, sob. will dc.

## 2025-02-09 NOTE — ED PROVIDER NOTE - CLINICAL SUMMARY MEDICAL DECISION MAKING FREE TEXT BOX
Patient is a previously healthy 39-year-old male who was at a  5 days ago.  At 3 days ago he began to have flulike symptoms with bodyaches cough fever chills.  He has been trying over-the-counter remedies but feels awful so he came to the emergency room for care and evaluation.  He denies hematuria.  He denies rash.  He did have an episode of bloody nose from frequent nose blowing.  He also has congestion and earache.  He endorses body aches.    On evaluation is a well-developed well-nourished male no apparent distress.  He appears mildly diaphoretic.  And endorses intermittent fevers and chills.  Patient has moderate oropharyngeal erythema.  Cerumen in both TMs with normal TMs visible.  HEENT neck is supple.  No lymphadenopathy.  No G/F/R.  Occasional nonproductive cough.  Lungs are clear to auscultation after coughing to clear.  Cardiac exam is regular rate and rhythm without murmur.  Abdomen is soft and nontender without guarding rebound.  Musculoskeletal examination is normal.  Skin examination reveals diaphoresis with mild pallor.  Patient appears to have viral illness.  Plan of care includes chest x-ray, laboratory studies flu COVID strep testing.  Antipyretics.  And reassessment and disposition accordingly.  This chart was made with dictation software and may contain typographical errors.

## 2025-04-15 NOTE — ED ADULT TRIAGE NOTE - SPO2 (%)
Physical Therapy Visit    Visit Type: Daily Treatment Note  Visit: 4  Referring Provider: Ricardo Lin MD  Medical Diagnosis (from order): Z96.652 - History of total knee arthroplasty, left     SUBJECTIVE                                                                                                               -Reports knee is better, was hurting like crazy on Friday   -Low back pain has been bothering him more than low back, but still rated his knee pain out of 6/10  -Has ran out of Hydrocodone and Tramadol   -Wants to call Dr. Dawson for third cortisol injection for low back   -Home exercise program going okay     Pain / Symptoms  - Pain rating (out of 10): Current: 6 ; Best: 3; Worst: 9      OBJECTIVE                                                                                                                               Treatment     Therapeutic Exercise  Recumbent bike at 2.5 resistance for 6 minutes for increased range of motion, strength, and activity tolerance during subjective questioning.     Supine:   Heel slides with strap x 10   Straight leg raise 2 x 10, decreased quad lag     Seated:   Long arc quad with #3 2 x 10     Standing:   Mini squat 2 x 10   Lateral heel taps on 4\" step 2 x 10- reports quad tendon pain   Lateral stepping 2 laps at remedios-bar- verbal cues and demonstration to correct form  Mini lunges 2 x 10   Steps ups on 6\" step 2 x 10   Step up and over 6\" x 10   Lateral step ups 6\" left x 10     Leg press (Platform #6):   Double leg 85# 2 x 10 - verbal cues to control descent on leg press   Single leg #55 2 x 10    **All exercises performed on left unless otherwise noted    Skilled input: verbal instruction/cues, tactile instruction/cues and demonstration    Writer verbally educated and received verbal consent for hand placement, positioning of patient, and techniques to be performed today from patient for clothing adjustments for techniques, hand placement and palpation for  techniques and therapist position for techniques as described above and how they are pertinent to the patient's plan of care.      ASSESSMENT                                                                                                            Merlin presents to physical therapy for his 4th therapy session status post left total knee replacement revision. Patient has made good progress in therapy by increasing his range of motion and quad strength. Patient tolerated today's session well with no increase in pain or symptoms. He did report moderate knee pain (6 out of 10) prior to therapy today, although patient's pain levels did not match how he presented. He also noted significant low back pain prior to therapy. Today's session focused on quad strengthening. He demonstrates improved quad control with straight leg raises. Added single leg leg press, lateral heel taps, lateral stepping, and mini lunges. Patient required verbal cues and demonstration to correct form for lateral stepping. Patient continues to slowly progress towards all therapy goals. Continued physical therapy is recommended to decrease pain and return to prior level of function.    Pain/symptoms after session (out of 10): 6  Education:   - Results of above outlined education: Verbalizes understanding and Demonstrates understanding    PLAN                                                                                                                           Suggestions for next session as indicated: Progress per plan of care:   Quad and hip strengthening   Soft tissue and knee joint mobilization as needed   Referral for low back?        Therapy procedure time and total treatment time can be found documented on the Time Entry flowsheet     98

## 2025-04-19 NOTE — ED PROVIDER NOTE - AREA
Bronchitis  Rest: Make sure to get plenty of rest to help your body recover.  Hydration: Drink fluids like water, tea, or broth to stay hydrated and help loosen mucus.  Medications: If prescribed, take any medication as directed, including cough medicine or inhalers.  Avoid Irritants: Stay away from smoke, strong odors, or anything that could irritate your lungs.  What to Expect:  Coughing and mucus production may last a couple of weeks. This is normal as your body heals.  If your symptoms worsen or you have trouble breathing, contact your healthcare provider.  When to Seek Help:  Fever lasting more than 3 days or gets worse  If you experience shortness of breath or wheezing  If you cough up blood or if the cough worsens significantly  Follow-Up Care: If your symptoms do not improve in 1-2 weeks or get worse, contact your doctor.    
proximal